# Patient Record
Sex: FEMALE | Race: WHITE | Employment: OTHER | URBAN - METROPOLITAN AREA
[De-identification: names, ages, dates, MRNs, and addresses within clinical notes are randomized per-mention and may not be internally consistent; named-entity substitution may affect disease eponyms.]

---

## 2017-06-05 ENCOUNTER — HOSPITAL ENCOUNTER (OUTPATIENT)
Dept: MAMMOGRAPHY | Age: 77
Discharge: HOME | End: 2017-06-05
Attending: INTERNAL MEDICINE
Payer: MEDICARE

## 2017-06-05 DIAGNOSIS — Z12.31 VISIT FOR SCREENING MAMMOGRAM: ICD-10-CM

## 2017-06-05 PROCEDURE — 77063 BREAST TOMOSYNTHESIS BI: CPT

## 2017-09-25 ENCOUNTER — HOSPITAL ENCOUNTER (OUTPATIENT)
Dept: BONE DENSITY | Age: 77
Discharge: HOME | End: 2017-09-25
Attending: INTERNAL MEDICINE
Payer: MEDICARE

## 2017-09-25 DIAGNOSIS — M81.8 OSTEOPOROSIS, IDIOPATHIC: ICD-10-CM

## 2017-09-25 PROCEDURE — 77080 DXA BONE DENSITY AXIAL: CPT

## 2018-05-11 ENCOUNTER — HOSPITAL ENCOUNTER (OUTPATIENT)
Dept: VASCULAR SURGERY | Age: 78
Discharge: HOME | End: 2018-05-11
Attending: INTERNAL MEDICINE
Payer: MEDICARE

## 2018-05-11 DIAGNOSIS — I10 ESSENTIAL HYPERTENSION, MALIGNANT: ICD-10-CM

## 2018-05-11 PROCEDURE — 93975 VASCULAR STUDY: CPT

## 2018-07-13 ENCOUNTER — HOSPITAL ENCOUNTER (OUTPATIENT)
Dept: MAMMOGRAPHY | Age: 78
Discharge: HOME | End: 2018-07-13
Attending: INTERNAL MEDICINE
Payer: MEDICARE

## 2018-07-13 DIAGNOSIS — Z12.31 VISIT FOR SCREENING MAMMOGRAM: ICD-10-CM

## 2018-07-13 PROCEDURE — 77063 BREAST TOMOSYNTHESIS BI: CPT

## 2019-08-05 ENCOUNTER — HOSPITAL ENCOUNTER (OUTPATIENT)
Dept: MAMMOGRAPHY | Age: 79
Discharge: HOME | End: 2019-08-05
Attending: INTERNAL MEDICINE
Payer: MEDICARE

## 2019-08-05 DIAGNOSIS — Z12.31 VISIT FOR SCREENING MAMMOGRAM: ICD-10-CM

## 2019-08-05 PROCEDURE — 77063 BREAST TOMOSYNTHESIS BI: CPT

## 2019-12-19 ENCOUNTER — HOSPITAL ENCOUNTER (OUTPATIENT)
Dept: LAB | Age: 79
Discharge: HOME | End: 2019-12-19
Payer: MEDICARE

## 2019-12-19 LAB
AST SERPL-CCNC: 18 U/L (ref 10–38)
BASOPHILS # BLD: 0.1 K/UL (ref 0–0.1)
BASOPHILS NFR BLD: 1 % (ref 0–2)
CREAT SERPL-MCNC: 0.98 MG/DL (ref 0.6–1.3)
DIFFERENTIAL METHOD BLD: ABNORMAL
EOSINOPHIL # BLD: 0.2 K/UL (ref 0–0.4)
EOSINOPHIL NFR BLD: 2 % (ref 0–5)
ERYTHROCYTE [DISTWIDTH] IN BLOOD BY AUTOMATED COUNT: 13.2 % (ref 11.6–14.5)
HCT VFR BLD AUTO: 38.8 % (ref 35–45)
HGB BLD-MCNC: 12.8 G/DL (ref 12–16)
LYMPHOCYTES # BLD: 1.1 K/UL (ref 0.9–3.6)
LYMPHOCYTES NFR BLD: 13 % (ref 21–52)
MCH RBC QN AUTO: 29.2 PG (ref 24–34)
MCHC RBC AUTO-ENTMCNC: 33 G/DL (ref 31–37)
MCV RBC AUTO: 88.6 FL (ref 74–97)
MONOCYTES # BLD: 0.7 K/UL (ref 0.05–1.2)
MONOCYTES NFR BLD: 8 % (ref 3–10)
NEUTS SEG # BLD: 6.7 K/UL (ref 1.8–8)
NEUTS SEG NFR BLD: 76 % (ref 40–73)
PLATELET # BLD AUTO: 248 K/UL (ref 135–420)
PMV BLD AUTO: 10.8 FL (ref 9.2–11.8)
RBC # BLD AUTO: 4.38 M/UL (ref 4.2–5.3)
WBC # BLD AUTO: 8.8 K/UL (ref 4.6–13.2)

## 2019-12-19 PROCEDURE — 85025 COMPLETE CBC W/AUTO DIFF WBC: CPT

## 2019-12-19 PROCEDURE — 84450 TRANSFERASE (AST) (SGOT): CPT

## 2019-12-19 PROCEDURE — 84080 ASSAY ALKALINE PHOSPHATASES: CPT

## 2019-12-19 PROCEDURE — 36415 COLL VENOUS BLD VENIPUNCTURE: CPT

## 2019-12-21 LAB
ALP BONE CFR SERPL: 38 % (ref 14–68)
ALP INTEST CFR SERPL: 0 % (ref 0–18)
ALP LIVER CFR SERPL: 62 % (ref 18–85)
ALP SERPL-CCNC: 82 IU/L (ref 39–117)

## 2020-01-10 ENCOUNTER — HOSPITAL ENCOUNTER (OUTPATIENT)
Dept: BONE DENSITY | Age: 80
Discharge: HOME | End: 2020-01-10
Attending: INTERNAL MEDICINE
Payer: MEDICARE

## 2020-01-10 DIAGNOSIS — M81.0 POST-MENOPAUSAL OSTEOPOROSIS: ICD-10-CM

## 2020-01-10 PROCEDURE — 77080 DXA BONE DENSITY AXIAL: CPT

## 2020-11-24 ENCOUNTER — HOSPITAL ENCOUNTER (EMERGENCY)
Age: 80
Discharge: HOME | End: 2020-11-24
Attending: EMERGENCY MEDICINE
Payer: MEDICARE

## 2020-11-24 VITALS
DIASTOLIC BLOOD PRESSURE: 71 MMHG | WEIGHT: 111 LBS | HEIGHT: 70 IN | SYSTOLIC BLOOD PRESSURE: 159 MMHG | TEMPERATURE: 98.1 F | BODY MASS INDEX: 15.89 KG/M2 | RESPIRATION RATE: 18 BRPM | OXYGEN SATURATION: 100 % | HEART RATE: 92 BPM

## 2020-11-24 DIAGNOSIS — I16.0 ASYMPTOMATIC HYPERTENSIVE URGENCY: Primary | ICD-10-CM

## 2020-11-24 PROCEDURE — 74011250637 HC RX REV CODE- 250/637: Performed by: STUDENT IN AN ORGANIZED HEALTH CARE EDUCATION/TRAINING PROGRAM

## 2020-11-24 PROCEDURE — 99284 EMERGENCY DEPT VISIT MOD MDM: CPT

## 2020-11-24 RX ORDER — OLMESARTAN MEDOXOMIL 40 MG/1
40 TABLET ORAL DAILY
COMMUNITY

## 2020-11-24 RX ORDER — HYDRALAZINE HYDROCHLORIDE 25 MG/1
75 TABLET, FILM COATED ORAL 3 TIMES DAILY
COMMUNITY

## 2020-11-24 RX ORDER — HYDRALAZINE HYDROCHLORIDE 25 MG/1
75 TABLET, FILM COATED ORAL
Status: COMPLETED | OUTPATIENT
Start: 2020-11-24 | End: 2020-11-24

## 2020-11-24 RX ORDER — CARVEDILOL 6.25 MG/1
6.25 TABLET ORAL 2 TIMES DAILY WITH MEALS
COMMUNITY

## 2020-11-24 RX ORDER — MYCOPHENOLIC ACID 180 MG/1
360 TABLET, DELAYED RELEASE ORAL 3 TIMES DAILY
COMMUNITY

## 2020-11-24 RX ORDER — RABEPRAZOLE SODIUM 20 MG/1
20 TABLET, DELAYED RELEASE ORAL DAILY
COMMUNITY

## 2020-11-24 RX ORDER — CARVEDILOL 6.25 MG/1
6.25 TABLET ORAL
Status: COMPLETED | OUTPATIENT
Start: 2020-11-24 | End: 2020-11-24

## 2020-11-24 RX ADMIN — CARVEDILOL 6.25 MG: 6.25 TABLET, FILM COATED ORAL at 17:03

## 2020-11-24 RX ADMIN — HYDRALAZINE HYDROCHLORIDE 75 MG: 25 TABLET, FILM COATED ORAL at 17:02

## 2020-11-24 NOTE — ED TRIAGE NOTES
Patient presents with c/o elevated blood pressure. She states being advised by Dr. Katelin Welsh to report to ER for further evaluation of very high blood pressure.

## 2020-11-24 NOTE — ED NOTES
Medicated per order and connected to BP cuff and pulse ox for monitoring. Call bell in reach. Warm blanket provided.

## 2020-11-24 NOTE — DISCHARGE INSTRUCTIONS
Patient Education        Acute High Blood Pressure: Care Instructions  Your Care Instructions     Acute high blood pressure is very high blood pressure. It's a serious problem. Very high blood pressure can damage your brain, heart, eyes, and kidneys. You may have been given medicines to lower your blood pressure. You may have gotten them as pills or through a needle in one of your veins. This is called an IV. And maybe you were given other medicines too. These can be needed when high blood pressure causes other problems. To keep your blood pressure at a lower level, you may need to make healthy lifestyle changes. And you will probably need to take medicines. Be sure to follow up with your doctor about your blood pressure and what you can do about it. Follow-up care is a key part of your treatment and safety. Be sure to make and go to all appointments, and call your doctor if you are having problems. It's also a good idea to know your test results and keep a list of the medicines you take. How can you care for yourself at home? · See your doctor as often as he or she recommends. This is to make sure your blood pressure is under control. · Take your blood pressure medicine exactly as prescribed. You may take one or more types. They include diuretics, beta-blockers, ACE inhibitors, calcium channel blockers, and angiotensin II receptor blockers. Call your doctor if you think you are having a problem with your medicine. · If you take blood pressure medicine, talk to your doctor before you take decongestants or anti-inflammatory medicine, such as ibuprofen. These can raise blood pressure. · Learn how to check your blood pressure at home. Check it often. · Ask your doctor if it's okay to drink alcohol. · Talk to your doctor about lifestyle changes that can help blood pressure. These include being active and managing your weight. · Don't smoke. Smoking increases your risk for heart attack and stroke.   When should you call for help? Call  911 anytime you think you may need emergency care. This may mean having symptoms that suggest that your blood pressure is causing a serious heart or blood vessel problem. Your blood pressure may be over 180/120. For example, call 911 if:    · You have symptoms of a heart attack. These may include:  ? Chest pain or pressure, or a strange feeling in the chest.  ? Sweating. ? Shortness of breath. ? Nausea or vomiting. ? Pain, pressure, or a strange feeling in the back, neck, jaw, or upper belly or in one or both shoulders or arms. ? Lightheadedness or sudden weakness. ? A fast or irregular heartbeat.     · You have symptoms of a stroke. These may include:  ? Sudden numbness, tingling, weakness, or loss of movement in your face, arm, or leg, especially on only one side of your body. ? Sudden vision changes. ? Sudden trouble speaking. ? Sudden confusion or trouble understanding simple statements. ? Sudden problems with walking or balance. ? A sudden, severe headache that is different from past headaches.     · You have severe back or belly pain. Do not wait until your blood pressure comes down on its own. Get help right away. Call your doctor now or seek immediate care if:    · Your blood pressure is much higher than normal (such as 180/120 or higher), but you don't have symptoms.     · You think high blood pressure is causing symptoms, such as:  ? Severe headache.  ? Blurry vision. Watch closely for changes in your health, and be sure to contact your doctor if:    · Your blood pressure measures higher than your doctor recommends at least 2 times. That means the top number is higher or the bottom number is higher, or both.     · You think you may be having side effects from your blood pressure medicine. Where can you learn more?   Go to http://www.gray.com/  Enter H919 in the search box to learn more about \"Acute High Blood Pressure: Care Instructions. \"  Current as of: December 16, 2019               Content Version: 12.6  © 5581-0205 [employer]. Care instructions adapted under license by [facility] (which disclaims liability or warranty for this information). If you have questions about a medical condition or this instruction, always ask your healthcare professional. [address] any warranty or liability for your use of this information.

## 2020-11-24 NOTE — ED PROVIDER NOTES
EMERGENCY DEPARTMENT HISTORY AND PHYSICAL EXAM    4:12 PM      Date: 11/24/2020  Patient Name: Krishna Mitchell    History of Presenting Illness     Chief Complaint   Patient presents with    Hypertension       History Provided By: Patient  Location/Duration/Severity/Modifying factors   Pt sent by Dr. Heath Hernandez for very high blood pressure. At Dr. Rivera Peoples office her BP was consistently in the 200's/100's despite recheck and manual measurement. She had no symptoms and still does not. She says about 6 weeks ago her losartan was changed to olmesartan. She has tried diuretics in the past and they were discontinued after some concern for falls associated with ?orthostatic hypotension. She has adverse reactions to lisinopril and amlodipine. She was changed from metoprolol to coreg recently also. PCP: Judson Rowan MD    Current Outpatient Medications   Medication Sig Dispense Refill    carvediloL (Coreg) 6.25 mg tablet Take 6.25 mg by mouth two (2) times daily (with meals).  hydrALAZINE (APRESOLINE) 25 mg tablet Take 75 mg by mouth three (3) times daily.  olmesartan (BENICAR) 40 mg tablet Take 40 mg by mouth daily.  RABEprazole (Aciphex) 20 mg tablet Take 20 mg by mouth daily.  mycophenolic acid (MYFORTIC) 240 mg delayed release tablet Take 360 mg by mouth three (3) times daily.  multivitamin (ONE A DAY) tablet Take 1 Tab by mouth daily.  simvastatin (ZOCOR) 40 mg tablet Take 1 Tab by mouth nightly. [Request refills from PCP (Dr. Carlos Enrique Gomez)]  Indications: MYOCARDIAL INFARCTION PREVENTION 15 Tab 0    cholecalciferol (VITAMIN D3) 1,000 unit tablet Take 2 Tabs by mouth two (2) times a day. [Request refills from PCP (Dr. Carlos Enrique Gomez)]  Indications: VITAMIN D DEFICIENCY 60 Tab 0    budesonide (ENTOCORT EC) 3 mg capsule Take 3 Caps by mouth daily. 100 Cap 0    aspirin 81 mg chewable tablet Take 81 mg by mouth daily (with breakfast).       clopidogrel (PLAVIX) 75 mg tablet Take 75 mg by mouth daily (with dinner). Past History     Past Medical History:  Past Medical History:   Diagnosis Date    Acute gouty arthritis 2014    Left great toe    Anemia in chronic kidney disease     Ankylosing spondylitis ([address]     Anticoagulated on Coumadin 2014    Atherosclerosis of native arteries of the extremities with ulceration(440.23)     Chronic systolic heart failure (HCC) 2014    EF 45% on 2D ECHO done 2014    CKD (chronic kidney disease) stage 4, GFR 15-29 ml/min ([facility])     Collagenous colitis     Fibrocystic breast changes     Fibrocystic breast disease     Gastroesophageal reflux disease     GERD (gastroesophageal reflux disease)     Hemorrhoids     Hiatal hernia     Hypertension     Menopause     Mild mitral regurgitation by prior echocardiogram 2014    2D ECHO done 2014    Moderate tricuspid regurgitation by prior echocardiogram 2014    2D ECHO done 2014    Mural thrombus of left ventricular apex following MI ([address] 2014    Anticoagulated on Coumadin    NSTEMI (non-ST elevated myocardial infarction) ([address] 2014    Osteoarthritis     Peripheral arterial disease ([address]     S/P coronary artery stent placement 6/3/2014    BRENDEN placed in LAD (2014 - Dr. Randi Dubin)    Severe protein-energy malnutrition ([address]     Vitamin D deficiency 2014    Vitamin D 25-Hydroxy 16.9       Past Surgical History:  Past Surgical History:   Procedure Laterality Date    HX  SECTION      HX CYST INCISION AND DRAINAGE Bilateral mid 's    Benign Bilateral- dr Baldo London    Placenta previa, Hemmorhage.     HX ORTHOPAEDIC      Knee-Torn Meniscus       Family History:  Family History   Problem Relation Age of Onset    Heart Disease Mother         MVP    Lung Disease Father         Black Lung Disease       Social History:  Social History     Tobacco Use    Smoking status: Never Smoker    Smokeless tobacco: Never Used   Substance Use Topics    Alcohol use: Yes     Comment: social    Drug use: No       Allergies: Allergies   Allergen Reactions    Adhesive Other (comments)     Redness from bandaid adhesive    Embeline [Clobetasol] Hives    Etanercept Other (comments)    Methotrexate Nausea and Vomiting and Other (comments)     fever    Mobic [Meloxicam] Hives    Monopril [Fosinopril] Rash    Norvasc [Amlodipine] Other (comments)     headache    Oats Nausea Only         Review of Systems     Review of Systems   Constitutional: Negative for chills, diaphoresis, fatigue and fever. HENT: Negative for congestion, facial swelling, hearing loss, rhinorrhea and sinus pain. Eyes: Negative for pain and visual disturbance. Respiratory: Negative for cough and shortness of breath. Cardiovascular: Negative for chest pain, palpitations and leg swelling. Gastrointestinal: Negative for abdominal pain, constipation, nausea and vomiting. Endocrine: Negative for polydipsia and polyuria. Genitourinary: Negative for dysuria, pelvic pain and urgency. Musculoskeletal: Negative for joint swelling and myalgias. Skin: Negative for color change and rash. Neurological: Negative for dizziness, tremors, syncope, weakness, light-headedness, numbness and headaches. Psychiatric/Behavioral: Negative for agitation, confusion, decreased concentration and dysphoric mood. The patient is nervous/anxious. Physical Exam     Visit Vitals  BP (!) 159/71   Pulse 92   Temp 98.1 °F (36.7 °C)   Resp 18   Ht 5' 10\" (1.778 m)   Wt 50.3 kg (111 lb)   SpO2 100%   BMI 15.93 kg/m²         Physical Exam  Constitutional:       General: She is not in acute distress. Appearance: Normal appearance. HENT:      Head: Normocephalic and atraumatic. Mouth/Throat:      Mouth: Mucous membranes are moist.      Pharynx: Oropharynx is clear. Eyes:      Extraocular Movements: Extraocular movements intact. Conjunctiva/sclera: Conjunctivae normal.   Neck:      Musculoskeletal: No neck rigidity or muscular tenderness. Cardiovascular:      Rate and Rhythm: Normal rate and regular rhythm. Pulmonary:      Effort: Pulmonary effort is normal.      Breath sounds: Normal breath sounds. Abdominal:      Palpations: Abdomen is soft. Tenderness: There is no abdominal tenderness. Musculoskeletal:         General: No swelling or tenderness. Skin:     General: Skin is warm and dry. Neurological:      General: No focal deficit present. Mental Status: She is alert and oriented to person, place, and time. Mental status is at baseline. Cranial Nerves: No cranial nerve deficit. Motor: No weakness. Gait: Gait normal.   Psychiatric:         Mood and Affect: Mood normal.         Behavior: Behavior normal.      Comments: Somewhat anxious           Diagnostic Study Results     Labs -  No results found for this or any previous visit (from the past 12 hour(s)). Radiologic Studies -   No orders to display         Medical Decision Making   I am the first provider for this patient. I reviewed the vital signs, available nursing notes, past medical history, past surgical history, family history and social history. Vital Signs-Reviewed the patient's vital signs. Records Reviewed: Nursing Notes and Old Medical Records (Time of Review: 4:12 PM)    ED Course: Progress Notes, Reevaluation, and Consults:  Pt given extra dose of carvedilol 6.25. She was given her 75 of PO hydralazine that she had due and her BP improved to 150's/80's. Discussed doubling coreg until her FU with her PCP scheduled in ~8 days. Provider Notes (Medical Decision Making):   MDM  Number of Diagnoses or Management Options  Asymptomatic hypertensive urgency:   Diagnosis management comments: Asymptomatic high blood pressure, no signs or symptoms of end organ dysfunction, close FU with PCP already scheduled.  Return precautions given.     DDx: hypertensive emergency, stroke, ACS, panic attack, TARA, pheochromocytoma       Procedures    Critical Care Time:       Diagnosis     Clinical Impression:   1. Asymptomatic hypertensive urgency        Disposition:     Follow-up Information     Follow up With Specialties Details Why Contact Info    Manan Lozano MD Internal Medicine In 1 week for continued BP management [place] [id number]  [phone]             Discharge Medication List as of 11/24/2020  5:47 PM      CONTINUE these medications which have NOT CHANGED    Details   carvediloL (Coreg) 6.25 mg tablet Take 6.25 mg by mouth two (2) times daily (with meals). , Historical Med      hydrALAZINE (APRESOLINE) 25 mg tablet Take 75 mg by mouth three (3) times daily. , Historical Med      olmesartan (BENICAR) 40 mg tablet Take 40 mg by mouth daily. , Historical Med      RABEprazole (Aciphex) 20 mg tablet Take 20 mg by mouth daily. , Historical Med      mycophenolic acid (MYFORTIC) 075 mg delayed release tablet Take 360 mg by mouth three (3) times daily. , Historical Med      multivitamin (ONE A DAY) tablet Take 1 Tab by mouth daily. , Historical Med      simvastatin (ZOCOR) 40 mg tablet Take 1 Tab by mouth nightly. [Request refills from PCP (Dr. Tuan Gomez)]  Indications: MYOCARDIAL INFARCTION PREVENTION, Normal, Disp-15 Tab, R-0      cholecalciferol (VITAMIN D3) 1,000 unit tablet Take 2 Tabs by mouth two (2) times a day. [Request refills from PCP (Dr. Tuan Gomez)]  Indications: VITAMIN D DEFICIENCY, Normal, Disp-60 Tab, R-0      budesonide (ENTOCORT EC) 3 mg capsule Take 3 Caps by mouth daily. , Print, Disp-100 Cap, R-0      aspirin 81 mg chewable tablet Take 81 mg by mouth daily (with breakfast). , Historical Med      clopidogrel (PLAVIX) 75 mg tablet Take 75 mg by mouth daily (with dinner). , Pratik Garcia MD  [facility]  4:14 PM

## 2020-12-04 ENCOUNTER — HOSPITAL ENCOUNTER (EMERGENCY)
Age: 80
Discharge: HOME | End: 2020-12-04
Attending: EMERGENCY MEDICINE
Payer: MEDICARE

## 2020-12-04 ENCOUNTER — APPOINTMENT (OUTPATIENT)
Dept: GENERAL RADIOLOGY | Age: 80
End: 2020-12-04
Attending: PHYSICIAN ASSISTANT
Payer: MEDICARE

## 2020-12-04 VITALS
HEART RATE: 83 BPM | RESPIRATION RATE: 19 BRPM | SYSTOLIC BLOOD PRESSURE: 156 MMHG | WEIGHT: 111 LBS | BODY MASS INDEX: 15.89 KG/M2 | OXYGEN SATURATION: 99 % | TEMPERATURE: 97.3 F | HEIGHT: 70 IN | DIASTOLIC BLOOD PRESSURE: 58 MMHG

## 2020-12-04 DIAGNOSIS — F41.1 ANXIETY STATE: ICD-10-CM

## 2020-12-04 DIAGNOSIS — R55 SYNCOPE, UNSPECIFIED SYNCOPE TYPE: Primary | ICD-10-CM

## 2020-12-04 LAB
ALBUMIN SERPL-MCNC: 4 G/DL (ref 3.4–5)
ALBUMIN/GLOB SERPL: 1 {RATIO} (ref 0.8–1.7)
ALP SERPL-CCNC: 61 U/L (ref 45–117)
ALT SERPL-CCNC: 22 U/L (ref 13–56)
ANION GAP SERPL CALC-SCNC: 6 MMOL/L (ref 3–18)
AST SERPL-CCNC: 16 U/L (ref 10–38)
ATRIAL RATE: 67 BPM
BASOPHILS # BLD: 0.1 K/UL (ref 0–0.1)
BASOPHILS NFR BLD: 1 % (ref 0–2)
BILIRUB SERPL-MCNC: 0.3 MG/DL (ref 0.2–1)
BNP SERPL-MCNC: 1368 PG/ML (ref 0–1800)
BUN SERPL-MCNC: 35 MG/DL (ref 7–18)
BUN/CREAT SERPL: 31 (ref 12–20)
CALCIUM SERPL-MCNC: 9.5 MG/DL (ref 8.5–10.1)
CALCULATED P AXIS, ECG09: 84 DEGREES
CALCULATED R AXIS, ECG10: -52 DEGREES
CALCULATED T AXIS, ECG11: 92 DEGREES
CHLORIDE SERPL-SCNC: 99 MMOL/L (ref 100–111)
CK MB CFR SERPL CALC: 2.9 % (ref 0–4)
CK MB CFR SERPL CALC: 3.3 % (ref 0–4)
CK MB SERPL-MCNC: 1.7 NG/ML (ref 5–25)
CK MB SERPL-MCNC: 1.7 NG/ML (ref 5–25)
CK SERPL-CCNC: 52 U/L (ref 26–192)
CK SERPL-CCNC: 58 U/L (ref 26–192)
CO2 SERPL-SCNC: 29 MMOL/L (ref 21–32)
CREAT SERPL-MCNC: 1.13 MG/DL (ref 0.6–1.3)
DIAGNOSIS, 93000: NORMAL
DIFFERENTIAL METHOD BLD: ABNORMAL
EOSINOPHIL # BLD: 0.2 K/UL (ref 0–0.4)
EOSINOPHIL NFR BLD: 3 % (ref 0–5)
ERYTHROCYTE [DISTWIDTH] IN BLOOD BY AUTOMATED COUNT: 13 % (ref 11.6–14.5)
GLOBULIN SER CALC-MCNC: 3.9 G/DL (ref 2–4)
GLUCOSE SERPL-MCNC: 108 MG/DL (ref 74–99)
HCT VFR BLD AUTO: 37.7 % (ref 35–45)
HGB BLD-MCNC: 12.9 G/DL (ref 12–16)
LYMPHOCYTES # BLD: 1.1 K/UL (ref 0.9–3.6)
LYMPHOCYTES NFR BLD: 12 % (ref 21–52)
MAGNESIUM SERPL-MCNC: 2.2 MG/DL (ref 1.6–2.6)
MCH RBC QN AUTO: 29.7 PG (ref 24–34)
MCHC RBC AUTO-ENTMCNC: 34.2 G/DL (ref 31–37)
MCV RBC AUTO: 86.7 FL (ref 74–97)
MONOCYTES # BLD: 0.7 K/UL (ref 0.05–1.2)
MONOCYTES NFR BLD: 8 % (ref 3–10)
NEUTS SEG # BLD: 6.9 K/UL (ref 1.8–8)
NEUTS SEG NFR BLD: 76 % (ref 40–73)
P-R INTERVAL, ECG05: 164 MS
PLATELET # BLD AUTO: 216 K/UL (ref 135–420)
PMV BLD AUTO: 11 FL (ref 9.2–11.8)
POTASSIUM SERPL-SCNC: 3.9 MMOL/L (ref 3.5–5.5)
PROT SERPL-MCNC: 7.9 G/DL (ref 6.4–8.2)
Q-T INTERVAL, ECG07: 432 MS
QRS DURATION, ECG06: 130 MS
QTC CALCULATION (BEZET), ECG08: 456 MS
RBC # BLD AUTO: 4.35 M/UL (ref 4.2–5.3)
SODIUM SERPL-SCNC: 134 MMOL/L (ref 136–145)
TROPONIN I SERPL-MCNC: 0.03 NG/ML (ref 0–0.04)
TROPONIN I SERPL-MCNC: 0.04 NG/ML (ref 0–0.04)
TSH SERPL DL<=0.05 MIU/L-ACNC: 1 UIU/ML (ref 0.36–3.74)
VENTRICULAR RATE, ECG03: 67 BPM
WBC # BLD AUTO: 9 K/UL (ref 4.6–13.2)

## 2020-12-04 PROCEDURE — 84443 ASSAY THYROID STIM HORMONE: CPT

## 2020-12-04 PROCEDURE — 82550 ASSAY OF CK (CPK): CPT

## 2020-12-04 PROCEDURE — 83880 ASSAY OF NATRIURETIC PEPTIDE: CPT

## 2020-12-04 PROCEDURE — 99284 EMERGENCY DEPT VISIT MOD MDM: CPT

## 2020-12-04 PROCEDURE — 85025 COMPLETE CBC W/AUTO DIFF WBC: CPT

## 2020-12-04 PROCEDURE — 96374 THER/PROPH/DIAG INJ IV PUSH: CPT

## 2020-12-04 PROCEDURE — 93005 ELECTROCARDIOGRAM TRACING: CPT

## 2020-12-04 PROCEDURE — 71045 X-RAY EXAM CHEST 1 VIEW: CPT

## 2020-12-04 PROCEDURE — 80053 COMPREHEN METABOLIC PANEL: CPT

## 2020-12-04 PROCEDURE — 74011250636 HC RX REV CODE- 250/636: Performed by: PHYSICIAN ASSISTANT

## 2020-12-04 PROCEDURE — 83735 ASSAY OF MAGNESIUM: CPT

## 2020-12-04 PROCEDURE — 74011250637 HC RX REV CODE- 250/637: Performed by: PHYSICIAN ASSISTANT

## 2020-12-04 RX ORDER — LORAZEPAM 2 MG/ML
0.5 INJECTION INTRAMUSCULAR ONCE
Status: COMPLETED | OUTPATIENT
Start: 2020-12-04 | End: 2020-12-04

## 2020-12-04 RX ADMIN — LORAZEPAM 0.5 MG: 2 INJECTION INTRAMUSCULAR; INTRAVENOUS at 17:03

## 2020-12-04 RX ADMIN — HYDRALAZINE HYDROCHLORIDE 75 MG: 50 TABLET, FILM COATED ORAL at 17:02

## 2020-12-04 NOTE — ED PROVIDER NOTES
EMERGENCY DEPARTMENT HISTORY AND PHYSICAL EXAM    2:41 PM      Date: 2020  Patient Name: Graciela Brar    History of Presenting Illness     Chief Complaint   Patient presents with    Dizziness         History Provided By: Patient and EMS  Location/Duration/Severity/Modifying factors   This is a 78 yo female with a PMH of HTN, Hyperlipidemia, CHF, CKD, NSTEMI, Anxiety presenting via EMS after a syncopal episode while attending a . Patient reports she was standing, felt lightheaded and then sat down and passed out. Her daughter witnessed the entire event. Patient reports previous similar episode over  where she was evaluated at the ED and Cardiology and is on a Holter monitor. She reports they discovered she had low sodium. Patient reports she has anxiety and thinks these episodes are related. She reports her symptoms have resolved here in the ED. She denies any chest pain, shortness of breath, fever, nausea, vomiting, or abdominal pain. PCP: Luna Zarco MD    Current Outpatient Medications   Medication Sig Dispense Refill    carvediloL (Coreg) 6.25 mg tablet Take 6.25 mg by mouth two (2) times daily (with meals).  hydrALAZINE (APRESOLINE) 25 mg tablet Take 75 mg by mouth three (3) times daily.  olmesartan (BENICAR) 40 mg tablet Take 40 mg by mouth daily.  RABEprazole (Aciphex) 20 mg tablet Take 20 mg by mouth daily.  mycophenolic acid (MYFORTIC) 262 mg delayed release tablet Take 360 mg by mouth three (3) times daily.  multivitamin (ONE A DAY) tablet Take 1 Tab by mouth daily.  simvastatin (ZOCOR) 40 mg tablet Take 1 Tab by mouth nightly. [Request refills from PCP (Dr. Hugo Gomez)]  Indications: MYOCARDIAL INFARCTION PREVENTION 15 Tab 0    cholecalciferol (VITAMIN D3) 1,000 unit tablet Take 2 Tabs by mouth two (2) times a day.  [Request refills from PCP (Dr. Hugo Gomez)]  Indications: VITAMIN D DEFICIENCY 60 Tab 0    budesonide (ENTOCORT EC) 3 mg capsule Take 3 Caps by mouth daily. 100 Cap 0    aspirin 81 mg chewable tablet Take 81 mg by mouth daily (with breakfast).  clopidogrel (PLAVIX) 75 mg tablet Take 75 mg by mouth daily (with dinner). Past History     Past Medical History:  Past Medical History:   Diagnosis Date    Acute gouty arthritis 2014    Left great toe    Anemia in chronic kidney disease     Ankylosing spondylitis ([address]     Anticoagulated on Coumadin 2014    Atherosclerosis of native arteries of the extremities with ulceration(440.23)     Chronic systolic heart failure (HCC) 2014    EF 45% on 2D ECHO done 2014    CKD (chronic kidney disease) stage 4, GFR 15-29 ml/min ([facility])     Collagenous colitis     Fibrocystic breast changes     Fibrocystic breast disease     Gastroesophageal reflux disease     GERD (gastroesophageal reflux disease)     Hemorrhoids     Hiatal hernia     Hypertension     Menopause     Mild mitral regurgitation by prior echocardiogram 2014    2D ECHO done 2014    Moderate tricuspid regurgitation by prior echocardiogram 2014    2D ECHO done 2014    Mural thrombus of left ventricular apex following MI ([address] 2014    Anticoagulated on Coumadin    NSTEMI (non-ST elevated myocardial infarction) ([address] 2014    Osteoarthritis     Peripheral arterial disease ([address]     S/P coronary artery stent placement 6/3/2014    BRENDEN placed in LAD (2014 - Dr. Tracy Hager)    Severe protein-energy malnutrition ([address]     Vitamin D deficiency 2014    Vitamin D 25-Hydroxy 16.9       Past Surgical History:  Past Surgical History:   Procedure Laterality Date    HX  SECTION      HX CYST INCISION AND DRAINAGE Bilateral mid 's    Benign Bilateral- dr Marguerite Corrales    Placenta previa, Hemmorhage.     HX ORTHOPAEDIC      Knee-Torn Meniscus       Family History:  Family History   Problem Relation Age of Onset    Heart Disease Mother         MVP    Lung Disease Father         Black Lung Disease       Social History:  Social History     Tobacco Use    Smoking status: Never Smoker    Smokeless tobacco: Never Used   Substance Use Topics    Alcohol use: Yes     Comment: social    Drug use: No       Allergies: Allergies   Allergen Reactions    Adhesive Other (comments)     Redness from bandaid adhesive    Embeline [Clobetasol] Hives    Etanercept Other (comments)    Methotrexate Nausea and Vomiting and Other (comments)     fever    Mobic [Meloxicam] Hives    Monopril [Fosinopril] Rash    Norvasc [Amlodipine] Other (comments)     headache    Oats Nausea Only         Review of Systems     Review of Systems   Constitutional: Negative for chills, fatigue and fever. HENT: Negative for sore throat, trouble swallowing and voice change. Eyes: Negative for visual disturbance. Respiratory: Negative for chest tightness, shortness of breath and wheezing. Cardiovascular: Negative for chest pain, palpitations and leg swelling. Gastrointestinal: Negative for abdominal pain, diarrhea, nausea and vomiting. Genitourinary: Negative for dysuria and hematuria. Musculoskeletal: Positive for arthralgias. Skin: Negative. Neurological: Positive for tremors, syncope and light-headedness. Negative for dizziness, speech difficulty, weakness, numbness and headaches. Psychiatric/Behavioral: Positive for agitation. Negative for confusion. The patient is nervous/anxious. All other systems reviewed and are negative. Physical Exam     Visit Vitals  BP (!) 156/58   Pulse 83   Temp 97.3 °F (36.3 °C)   Resp 19   Ht 5' 10\" (1.778 m)   Wt 50.3 kg (111 lb)   SpO2 99%   BMI 15.93 kg/m²       Physical Exam  Vitals signs and nursing note reviewed. Constitutional:       General: She is not in acute distress. Appearance: Normal appearance. HENT:      Head: Normocephalic and atraumatic.       Right Ear: External ear normal. Left Ear: External ear normal.      Nose: Nose normal.      Mouth/Throat:      Pharynx: Oropharynx is clear. Eyes:      Extraocular Movements: Extraocular movements intact. Pupils: Pupils are equal, round, and reactive to light. Neck:      Musculoskeletal: Normal range of motion. Cardiovascular:      Rate and Rhythm: Normal rate and regular rhythm. Pulses: Normal pulses. Heart sounds: Murmur present. Pulmonary:      Effort: Pulmonary effort is normal. No respiratory distress. Breath sounds: Normal breath sounds. No wheezing. Abdominal:      Palpations: Abdomen is soft. Tenderness: There is no abdominal tenderness. There is no guarding. Musculoskeletal: Normal range of motion. General: Tenderness present. Comments: Chronic right ankle pain from fusion   Skin:     General: Skin is warm and dry. Capillary Refill: Capillary refill takes less than 2 seconds. Neurological:      Mental Status: She is alert and oriented to person, place, and time. Mental status is at baseline. Cranial Nerves: No cranial nerve deficit. Sensory: No sensory deficit. Motor: No weakness.       Coordination: Coordination normal.   Psychiatric:         Mood and Affect: Mood normal.           Diagnostic Study Results     Labs -  Recent Results (from the past 12 hour(s))   EKG, 12 LEAD, INITIAL    Collection Time: 12/04/20  2:57 PM   Result Value Ref Range    Ventricular Rate 67 BPM    Atrial Rate 67 BPM    P-R Interval 164 ms    QRS Duration 130 ms    Q-T Interval 432 ms    QTC Calculation (Bezet) 456 ms    Calculated P Axis 84 degrees    Calculated R Axis -52 degrees    Calculated T Axis 92 degrees    Diagnosis       Sinus rhythm with occasional premature ventricular complexes  Possible Left atrial enlargement  Left axis deviation  Left ventricular hypertrophy with QRS widening and repolarization abnormality  Abnormal ECG  When compared with ECG of 04-JUN-2014 05:08,  Significant changes have occurred  Confirmed by Dorene Reid ([id number]) on 12/4/2020 1:07:85 PM     METABOLIC PANEL, COMPREHENSIVE    Collection Time: 12/04/20  3:15 PM   Result Value Ref Range    Sodium 134 (L) 136 - 145 mmol/L    Potassium 3.9 3.5 - 5.5 mmol/L    Chloride 99 (L) 100 - 111 mmol/L    CO2 29 21 - 32 mmol/L    Anion gap 6 3.0 - 18 mmol/L    Glucose 108 (H) 74 - 99 mg/dL    BUN 35 (H) 7.0 - 18 MG/DL    Creatinine 1.13 0.6 - 1.3 MG/DL    BUN/Creatinine ratio 31 (H) 12 - 20      GFR est AA 56 (L) >60 ml/min/1.73m2    GFR est non-AA 46 (L) >60 ml/min/1.73m2    Calcium 9.5 8.5 - 10.1 MG/DL    Bilirubin, total 0.3 0.2 - 1.0 MG/DL    ALT (SGPT) 22 13 - 56 U/L    AST (SGOT) 16 10 - 38 U/L    Alk. phosphatase 61 45 - 117 U/L    Protein, total 7.9 6.4 - 8.2 g/dL    Albumin 4.0 3.4 - 5.0 g/dL    Globulin 3.9 2.0 - 4.0 g/dL    A-G Ratio 1.0 0.8 - 1.7     CARDIAC PANEL,(CK, CKMB & TROPONIN)    Collection Time: 12/04/20  3:15 PM   Result Value Ref Range    CK - MB 1.7 <3.6 ng/ml    CK-MB Index 2.9 0.0 - 4.0 %    CK 58 26 - 192 U/L    Troponin-I, QT 0.03 0.0 - 0.045 NG/ML   CBC WITH AUTOMATED DIFF    Collection Time: 12/04/20  3:15 PM   Result Value Ref Range    WBC 9.0 4.6 - 13.2 K/uL    RBC 4.35 4.20 - 5.30 M/uL    HGB 12.9 12.0 - 16.0 g/dL    HCT 37.7 35.0 - 45.0 %    MCV 86.7 74.0 - 97.0 FL    MCH 29.7 24.0 - 34.0 PG    MCHC 34.2 31.0 - 37.0 g/dL    RDW 13.0 11.6 - 14.5 %    PLATELET 733 523 - 412 K/uL    MPV 11.0 9.2 - 11.8 FL    NEUTROPHILS 76 (H) 40 - 73 %    LYMPHOCYTES 12 (L) 21 - 52 %    MONOCYTES 8 3 - 10 %    EOSINOPHILS 3 0 - 5 %    BASOPHILS 1 0 - 2 %    ABS. NEUTROPHILS 6.9 1.8 - 8.0 K/UL    ABS. LYMPHOCYTES 1.1 0.9 - 3.6 K/UL    ABS. MONOCYTES 0.7 0.05 - 1.2 K/UL    ABS. EOSINOPHILS 0.2 0.0 - 0.4 K/UL    ABS.  BASOPHILS 0.1 0.0 - 0.1 K/UL    DF AUTOMATED     TSH 3RD GENERATION    Collection Time: 12/04/20  3:15 PM   Result Value Ref Range    TSH 1.00 0.36 - 3.74 uIU/mL   MAGNESIUM Collection Time: 12/04/20  3:15 PM   Result Value Ref Range    Magnesium 2.2 1.6 - 2.6 mg/dL   NT-PRO BNP    Collection Time: 12/04/20  3:15 PM   Result Value Ref Range    NT pro-BNP 1,368 0 - 1,800 PG/ML   CARDIAC PANEL,(CK, CKMB & TROPONIN)    Collection Time: 12/04/20  7:08 PM   Result Value Ref Range    CK - MB 1.7 <3.6 ng/ml    CK-MB Index 3.3 0.0 - 4.0 %    CK 52 26 - 192 U/L    Troponin-I, QT 0.04 0.0 - 0.045 NG/ML       Radiologic Studies -   XR CHEST PORT   Final Result   IMPRESSION:      Borderline prominent cardiac silhouette with perhaps mild perihilar interstitial   infiltrate/edema. Medical Decision Making   I am the first provider for this patient. I reviewed the vital signs, available nursing notes, past medical history, past surgical history, family history and social history. Vital Signs-Reviewed the patient's vital signs. EKG: Interpreted by myself and Dr. Raciel Simms. Rate 67. . . QTc 456. Normal sinus rhythm. No acute ischemic changes. Records Reviewed: Nursing Notes and Old Medical Records (Time of Review: 2:41 PM)    ED Course: Progress Notes, Reevaluation, and Consults:    ED Course as of Dec 04 2038   Fri Dec 04, 2020   1629 NT pro-BNP: 1,368 [JM]   1630 XR CHEST PORT [JM]      ED Course User Index  [JM] Sybil Noble, DO       Provider Notes (Medical Decision Making): This is a 78-year-old female presenting with signs and symptoms consistent with syncopal episode    Initial considerations in this patient include ACS, infection, thyroid abnormalities, arrhythmia, electrolyte derangement, hypoglycemia, anxiety, amongst others    Patient presented with a syncopal episode while standing. Patient denied any trauma. This is her second episode in 2 weeks. She is currently undergoing cardiology evaluation with Holter monitor for arrhythmia. EKG was obtained with no signs of acute ischemia.  Chest X-ray shows borderline prominent cardiac silhouette with perhaps mild perihilar interstitial infiltrate/edema. Initial cardiac biomarkers were negative so a repeat panel was ordered at three hours and was also negative. TSH was 1. Mag 2.2. BNP 1368. Sodium 134. Patient symptoms resolved prior to arrival to the emergency department. And she remained asymptomatic throughout her entire stay. Patient will be discharged home to follow-up with her primary care doctor in 2 days. Diagnosis     Clinical Impression:   1. Syncope, unspecified syncope type    2. Anxiety state        Disposition: Discharge home to follow-up with PCP in two days    Follow-up Information     Follow up With Specialties Details Why Lian Hoffman MD Internal Medicine In 2 days  Kapil Collins [phone]             Patient's Medications   Start Taking    No medications on file   Continue Taking    ASPIRIN 81 MG CHEWABLE TABLET    Take 81 mg by mouth daily (with breakfast). BUDESONIDE (ENTOCORT EC) 3 MG CAPSULE    Take 3 Caps by mouth daily. CARVEDILOL (COREG) 6.25 MG TABLET    Take 6.25 mg by mouth two (2) times daily (with meals). CHOLECALCIFEROL (VITAMIN D3) 1,000 UNIT TABLET    Take 2 Tabs by mouth two (2) times a day. [Request refills from PCP (Dr. Antony Gomez)]  Indications: VITAMIN D DEFICIENCY    CLOPIDOGREL (PLAVIX) 75 MG TABLET    Take 75 mg by mouth daily (with dinner). HYDRALAZINE (APRESOLINE) 25 MG TABLET    Take 75 mg by mouth three (3) times daily. MULTIVITAMIN (ONE A DAY) TABLET    Take 1 Tab by mouth daily. MYCOPHENOLIC ACID (MYFORTIC) 975 MG DELAYED RELEASE TABLET    Take 360 mg by mouth three (3) times daily. OLMESARTAN (BENICAR) 40 MG TABLET    Take 40 mg by mouth daily. RABEPRAZOLE (ACIPHEX) 20 MG TABLET    Take 20 mg by mouth daily. SIMVASTATIN (ZOCOR) 40 MG TABLET    Take 1 Tab by mouth nightly.  [Request refills from PCP (Dr. Antony Gomez)]  Indications: MYOCARDIAL INFARCTION PREVENTION   These Medications have changed    No medications on file   Stop Taking    No medications on file     Disclaimer: Sections of this note are dictated using utilizing voice recognition software. Minor typographical errors may be present. If questions arise, please do not hesitate to contact me or call our department. Jamie Hollingsworth [address] [place] [employer] DO [place]  Emergency Medicine Resident, PGY-1    I personally saw the patient. I have reviewed and agree with the resident's findings, including all diagnostic interpretations, and plans as written. I was present during the key portions of separately billed procedures.     [address] Vaughn, DO

## 2020-12-05 NOTE — ED NOTES
Pt discharge instructions reviewed with patient, patient denies questions and verbalizes understanding. IVs removed and all belongings with patient. Pt alert and oriented, no signs of distress. Pt assisted to wheelchair and transported to ED waiting room.

## 2021-02-15 ENCOUNTER — HOSPITAL ENCOUNTER (OUTPATIENT)
Dept: GENERAL RADIOLOGY | Age: 81
Discharge: HOME | End: 2021-02-15
Payer: MEDICARE

## 2021-02-15 DIAGNOSIS — M81.0 AGE RELATED OSTEOPOROSIS: ICD-10-CM

## 2021-02-15 PROCEDURE — 73630 X-RAY EXAM OF FOOT: CPT

## 2021-05-26 ENCOUNTER — HOSPITAL ENCOUNTER (OUTPATIENT)
Dept: WOMENS IMAGING | Age: 81
Discharge: HOME | End: 2021-05-26
Attending: INTERNAL MEDICINE
Payer: MEDICARE

## 2021-05-26 DIAGNOSIS — Z12.31 VISIT FOR SCREENING MAMMOGRAM: ICD-10-CM

## 2021-05-26 PROCEDURE — 77067 SCR MAMMO BI INCL CAD: CPT

## 2021-12-01 ENCOUNTER — TRANSCRIBE ORDER (OUTPATIENT)
Dept: SCHEDULING | Age: 81
End: 2021-12-01

## 2021-12-01 DIAGNOSIS — R60.0 LOCALIZED EDEMA: Primary | ICD-10-CM

## 2022-01-07 ENCOUNTER — HOSPITAL ENCOUNTER (OUTPATIENT)
Dept: VASCULAR SURGERY | Age: 82
Discharge: HOME | End: 2022-01-07
Attending: INTERNAL MEDICINE
Payer: MEDICARE

## 2022-01-07 DIAGNOSIS — R60.0 LOCALIZED EDEMA: ICD-10-CM

## 2022-01-07 PROCEDURE — 93970 EXTREMITY STUDY: CPT

## 2022-06-27 ENCOUNTER — HOSPITAL ENCOUNTER (OUTPATIENT)
Dept: PHYSICAL THERAPY | Age: 82
Discharge: HOME | End: 2022-06-27
Payer: MEDICARE

## 2022-06-27 PROCEDURE — 97530 THERAPEUTIC ACTIVITIES: CPT

## 2022-06-27 PROCEDURE — 97110 THERAPEUTIC EXERCISES: CPT

## 2022-06-27 PROCEDURE — 97162 PT EVAL MOD COMPLEX 30 MIN: CPT

## 2022-06-27 NOTE — PROGRESS NOTES
[facility]  [address] [facility] [address]  [place], [address]  [phone] fax    Plan of Care/ Statement of Necessity for Physical Therapy Services    Patient name: Gareth Espinosa Start of Care: 2022   Referral source: Merrick Pham MD : 1940    Medical Diagnosis: Difficulty in walking, not elsewhere classified [R26.2]  Muscle weakness (generalized) [M62.81]  Payor: Quique Shelby / Plan: [place] PART A & B / Product Type: Medicare /  Onset Date: 2021   Treatment Diagnosis: balance, gait instability    Prior Hospitalization: see medical history Provider#: [id number]   Medications: Verified on Patient summary List    Comorbidities: allergies, arthritis, CHF, GI disease, history of MI, HTN, osteoporosis, PVD, right talocrural fixation   Prior Level of Function: no limitations, walking dog ~20 minutes 2x/day     The Plan of Care and following information is based on the information from the initial evaluation. Assessment/ key information: Patient is an 81year old female presenting to clinic today with balance deficits and gait instability. Patient reports having COVID and PNA in 2021 which limited usual activities. In October, she began to return to daily activities and fell backwards when ascending the stairs fracturing her left radius and ulna requiring fixation. Patient has minimal confidence in balance and is fearful of falling with most activities. Patient reports she had no limitations prior to onset last Fall and was walking her dog ~20 minutes 2x/day. Patient currently limiting dog walking to 5 minutes in her backyard, has decreased balance with quick movements, and difficulty with stair negotiation. Patient with decreased balance noted in narrow base of support, on uneven surface, and with eyes closed. Her DGI score today was 10/24 indicative of increased fall risk.  Patient also demonstrates decreased right hip and knee strength compared to left, likely due to right talocrural fixation ~50 years ago with decreased general mobility and strength. She would benefit from skilled PT 2x/week for 4 weeks to address the above limitations and promote improved balance and confidence with daily activities and decrease fall risk. Evaluation Complexity History HIGH Complexity :3+ comorbidities / personal factors will impact the outcome/ POC ; Examination MEDIUM Complexity : 3 Standardized tests and measures addressing body structure, function, activity limitation and / or participation in recreation  ;Presentation MEDIUM Complexity : Evolving with changing characteristics  ; Clinical Decision Making MEDIUM Complexity : FOTO score of 26-74  Overall Complexity Rating: MEDIUM  Problem List: pain affecting function, decrease ROM, decrease strength, impaired gait/ balance, decrease ADL/ functional abilitiies, decrease activity tolerance, decrease flexibility/ joint mobility and decrease transfer abilities   Treatment Plan may include any combination of the following: Therapeutic exercise, Therapeutic activities, Neuromuscular re-education, Physical agent/modality, Gait/balance training, Manual therapy, Patient education, Self Care training, Functional mobility training, Home safety training and Stair training  Patient / Family readiness to learn indicated by: asking questions, trying to perform skills and interest  Persons(s) to be included in education: patient (P)  Barriers to Learning/Limitations: None  Patient Goal (s): increase balance and increase strength  Patient Self Reported Health Status: good  Rehabilitation Potential: good    Short Term Goals: To be accomplished in 2 weeks:  1. Patient will report performance of home exercise program 4 of 7 days in the next week demonstrating compliance to therapy program and progress towards independent management of symptoms. Long Term Goals: To be accomplished in 4 weeks:  1.  Patient will increase DGI to at least 19/24 to demonstrate decreased fall risk. 2. Patient will increase right hip strength and knee strength to at least 4+/5 to improve ease with transfers and stair negotiation. 3. Patient will report no difficulty walking dog for 15 minutes to promote return to PLOF. 4. Patient will increase MSR to at least 30\" to improve balance and decrease fall risk. 5. Patient will increase FOTO score to at least 59 to demonstrate improved ease with household activities. Frequency / Duration: Patient to be seen 2 times per week for 4 weeks. Patient/ Caregiver education and instruction: Diagnosis, prognosis, activity modification and exercises   [x]  Plan of care has been reviewed with PTA Ginny Riedel, PT, DPT 6/27/2022 12:32 PM    ________________________________________________________________________    I certify that the above Therapy Services are being furnished while the patient is under my care. I agree with the treatment plan and certify that this therapy is necessary.     [de-identified] Signature:____________Date:_________TIME:________     Sunil Montgomery MD  ** Signature, Date and Time must be completed for valid certification **    Please sign and return to [facility] [address] [place], [place] [address] [facility] [place] [address] [place] [address] Str.  [phone] fax

## 2022-06-27 NOTE — PROGRESS NOTES
PT DAILY TREATMENT NOTE     Patient Name: Maryhelen Bosworth  Date:2022  : 1940  [x]  Patient  Verified  Payor: Niels Winters / Plan: [place] / Product Type: Medicare /    In time: 421  Out time:  Total Treatment Time (min): 45  Visit #: 1 of 8    Medicare/BCBS Only   Total Timed Codes (min):  30 1:1 Treatment Time:  45       Treatment Area: Difficulty in walking, not elsewhere classified [R26.2]  Muscle weakness (generalized) [M62.81]    SUBJECTIVE  Pain Level (0-10 scale): 0  Any medication changes, allergies to medications, adverse drug reactions, diagnosis change, or new procedure performed?: [x] No    [] Yes (see summary sheet for update)  Subjective functional status/changes:   [] No changes reported  Patient reports having COVID and later diagnosed with PNA in 2021 which is when she stopped some of her normal activities. In October, she was trying to reintroduce activities again and fell backwards when ascending the stairs fracturing her right radius and ulnar requiring fixation. She has minimal confidence in her balance and is fearful of falling. OBJECTIVE    15 min [x]Eval                  []Re-Eval       15 min Therapeutic Exercise:  [x] See flow sheet : Patient provided printed HEP to begin addressing impairments. Patient provided demonstration of exercises and rationale for each exercise to improve compliance to HEP. Education provided on importance of compliance to HEP for improved carry over between therapy session. Rationale: increase ROM, increase strength and improve coordination to improve the patients ability to perform ADLs and self care tasks with greater ease     15 min Therapeutic Activity:  [x]  See flow sheet : Patient education provided on treatment and plan of care. Educated on 3 systems for balance training.     Rationale: increase strength and improve coordination  to improve the patients ability to perform functional tasks with greater ease With   [] TE   [] TA   [] neuro   [] other: Patient Education: [x] Review HEP    [] Progressed/Changed HEP based on:   [] positioning   [] body mechanics   [] transfers   [] heat/ice application    [] other:      Other Objective/Functional Measures: see paper chart for evaluation form     Pain Level (0-10 scale) post treatment: 0    ASSESSMENT/Changes in Function: Patient is an 81year old female presenting to clinic today with balance deficits and gait instability. Patient reports having COVID and PNA in 09/2021 which limited usual activities. In October, she began to return to daily activities and fell backwards when ascending the stairs fracturing her left radius and ulna requiring fixation. Patient has minimal confidence in balance and is fearful of falling with most activities. Patient reports she had no limitations prior to onset last Fall and was walking her dog ~20 minutes 2x/day. Patient currently limiting dog walking to 5 minutes in her backyard, has decreased balance with quick movements, and difficulty with stair negotiation. Patient with decreased balance noted in narrow base of support, on uneven surface, and with eyes closed. Her DGI score today was 10/24 indicative of increased fall risk. Patient also demonstrates decreased right hip and knee strength compared to left, likely due to right talocrural fixation ~50 years ago with decreased general mobility and strength. She would benefit from skilled PT 2x/week for 4 weeks to address the above limitations and promote improved balance and confidence with daily activities and decrease fall risk.      Patient will continue to benefit from skilled PT services to modify and progress therapeutic interventions, address functional mobility deficits, address ROM deficits, address strength deficits, analyze and address soft tissue restrictions, analyze and cue movement patterns, analyze and modify body mechanics/ergonomics, assess and modify postural abnormalities, address imbalance/dizziness and instruct in home and community integration to attain remaining goals. []  See Plan of Care  []  See progress note/recertification  []  See Discharge Summary         Progress towards goals / Updated goals:  Short Term Goals: To be accomplished in 2 weeks:  1. Patient will report performance of home exercise program 4 of 7 days in the next week demonstrating compliance to therapy program and progress towards independent management of symptoms. Eval: HEP provided and instructed     Long Term Goals: To be accomplished in 4 weeks:  1. Patient will increase DGI to at least 19/24 to demonstrate decreased fall risk. Eval: 10/24  2. Patient will increase right hip strength and knee strength to at least 4+/5 to improve ease with transfers and stair negotiation. Eval: 4-/5 grossly   3. Patient will report no difficulty walking dog for 15 minutes to promote return to PLOF. Eval: limiting to 5' in backyard only   4. Patient will increase MSR to at least 30\" to improve balance and decrease fall risk. Eval: 30\" romberg EO  5. Patient will increase FOTO score to at least 59 to demonstrate improved ease with household activities. Eval: 54    PLAN  []  Upgrade activities as tolerated     []  Continue plan of care  []  Update interventions per flow sheet       []  Discharge due to:_  []  Other:_      Tra Russell, PT, DPT  6/27/2022  12:24 PM    No future appointments.

## 2022-06-29 ENCOUNTER — HOSPITAL ENCOUNTER (OUTPATIENT)
Dept: PHYSICAL THERAPY | Age: 82
Discharge: HOME | End: 2022-06-29
Payer: MEDICARE

## 2022-06-29 PROCEDURE — 97112 NEUROMUSCULAR REEDUCATION: CPT

## 2022-06-29 PROCEDURE — 97110 THERAPEUTIC EXERCISES: CPT

## 2022-06-29 PROCEDURE — 97116 GAIT TRAINING THERAPY: CPT

## 2022-06-29 NOTE — PROGRESS NOTES
PT DAILY TREATMENT NOTE     Patient Name: Gauri Radford  Date:2022  : 1940  [x]  Patient  Verified  Payor: [place] MEDICARE / Plan: [place] MEDICARE PART A & B / Product Type: Medicare /    In time:209  Out time: 255  Total Treatment Time (min): 46  Visit #: 2 of 8    Medicare/BCBS Only   Total Timed Codes (min):  46 1:1 Treatment Time:  46       Treatment Area: Difficulty in walking, not elsewhere classified [R26.2]  Muscle weakness (generalized) [M62.81]    SUBJECTIVE  Pain Level (0-10 scale): 0/10  Any medication changes, allergies to medications, adverse drug reactions, diagnosis change, or new procedure performed?: [x] No    [] Yes (see summary sheet for update)  Subjective functional status/changes:   [] No changes reported  \" Endurance isn't very good\"    OBJECTIVE      20 min Therapeutic Exercise:  [x] See flow sheet : LE/Core strength per F/S   Rationale: increase strength to improve the patients ability to perform ADL's and self care with increased ease. 16 min Neuromuscular Re-education:  -  See flow sheet : Gait and Balance training   Rationale: improve coordination, improve balance and increase proprioception  to improve the patients ability to ambulate and perform ADL's with increased stabilization. 10 min Gait Trainin ft forward/ side step, fast/slow, pivot turns, step overs. Rationale: to prevent falls, increase stability. With   [] TE   [] TA   [] neuro   [] other: Patient Education: [x] Review HEP    [] Progressed/Changed HEP based on:   [] positioning   [] body mechanics   [] transfers   [] heat/ice application    [] other:      Other Objective/Functional Measures: Challenged with Right lateral step ups. Pain Level (0-10 scale) post treatment: 0/10    ASSESSMENT/Changes in Function: Patient challenged with endurance requiring 4 rest breaks during there ex. She was most challenged with balance during EC, dynamic balance and Right LE strength. Patient will continue to benefit from skilled PT services to modify and progress therapeutic interventions, address functional mobility deficits, address ROM deficits, address strength deficits, analyze and address soft tissue restrictions, analyze and cue movement patterns, analyze and modify body mechanics/ergonomics and assess and modify postural abnormalities to attain remaining goals. [x]  See Plan of Care  []  See progress note/recertification  []  See Discharge Summary         Progress towards goals / Updated goals:  Short Term Goals: To be accomplished in 2 weeks:  1. Patient will report performance of home exercise program 4 of 7 days in the next week demonstrating compliance to therapy program and progress towards independent management of symptoms. Eval: HEP provided and instructed    Current: Initiated HEP. 6/29/22     Long Term Goals: To be accomplished in 4 weeks:  1. Patient will increase DGI to at least 19/24 to demonstrate decreased fall risk. Eval: 10/24  2. Patient will increase right hip strength and knee strength to at least 4+/5 to improve ease with transfers and stair negotiation. Eval: 4-/5 grossly   3. Patient will report no difficulty walking dog for 15 minutes to promote return to PLOF. Eval: limiting to 5' in backyard only   4. Patient will increase MSR to at least 30\" to improve balance and decrease fall risk. Eval: 30\" romberg EO  5. Patient will increase FOTO score to at least 59 to demonstrate improved ease with household activities.                Eval: 54    PLAN  []  Upgrade activities as tolerated     [x]  Continue plan of care  []  Update interventions per flow sheet       []  Discharge due to:_  []  Other:_      Blanca Rhoades, PT 6/29/2022  12:41 PM    Future Appointments   Date Time Provider Justo Altman   6/29/2022  2:15 PM Jeff Zamorano, PT [facility]   7/6/2022  3:45 PM Dave Malave, PT [facility]   7/8/2022  2:15 PM Mark Rosas, PT [facility]   7/13/2022 10:30 AM Sourav Benavides, PT [facility]   7/15/2022 10:30 AM Sourav Benavides, PT [facility]   7/18/2022 10:45 AM Gallegos Keas [facility]   7/22/2022 10:30 AM Gallegos Keas [facility]

## 2022-07-06 ENCOUNTER — HOSPITAL ENCOUNTER (OUTPATIENT)
Dept: PHYSICAL THERAPY | Age: 82
Discharge: HOME | End: 2022-07-06
Payer: MEDICARE

## 2022-07-06 PROCEDURE — 97110 THERAPEUTIC EXERCISES: CPT

## 2022-07-06 PROCEDURE — 97116 GAIT TRAINING THERAPY: CPT

## 2022-07-06 PROCEDURE — 97112 NEUROMUSCULAR REEDUCATION: CPT

## 2022-07-06 NOTE — PROGRESS NOTES
PT DAILY TREATMENT NOTE 10-18    Patient Name: Mendy Lowry  Date:2022  : 1940  [x]  Patient  Verified  Payor: [place] MEDICARE / Plan: [place] MEDICARE PART A & B / Product Type: Medicare /    In time:345  Out time:429  Total Treatment Time (min): 44  Visit #: 3 of 8    Medicare/BCBS Only   Total Timed Codes (min):  44 1:1 Treatment Time:  44       Treatment Area: Difficulty in walking, not elsewhere classified [R26.2]  Muscle weakness (generalized) [M62.81]    SUBJECTIVE  Pain Level (0-10 scale): 0  Any medication changes, allergies to medications, adverse drug reactions, diagnosis change, or new procedure performed?: [x] No    [] Yes (see summary sheet for update)  Subjective functional status/changes:   [] No changes reported  I'm doing ok. OBJECTIVE    21 min Therapeutic Exercise:  [x] See flow sheet :   Rationale: increase strength, improve coordination, improve balance and increase proprioception to improve the patients ability to perform daily activities      13 min Neuromuscular Re-education:  [x]  See flow sheet :   Rationale: increase strength, improve coordination, improve balance and increase proprioception  to improve the patients stabiltiy for daily activities  10 min Dynamic gait: marching, side stepping; loi stepovers   Rationale: to improve stability for community ambulation    With   [] TE   [] TA   [] neuro   [] other: Patient Education: [x] Review HEP    [] Progressed/Changed HEP based on:   [] positioning   [] body mechanics   [] transfers   [] heat/ice application    [] other:      Other Objective/Functional Measures:      Pain Level (0-10 scale) post treatment: 0    ASSESSMENT/Changes in Function: LOB once with small loi step overs; required CGA with all dynamic gait.      Patient will continue to benefit from skilled PT services to modify and progress therapeutic interventions, address strength deficits, analyze and cue movement patterns and address imbalance/dizziness to attain remaining goals. []  See Plan of Care  []  See progress note/recertification  []  See Discharge Summary         Progress towards goals / Updated goals:  Short Term Goals: To be accomplished in 2 weeks:  1. Patient will report performance of home exercise program 4 of 7 days in the next week demonstrating compliance to therapy program and progress towards independent management of symptoms.             Eval: HEP provided and instructed               Current: Initiated HEP. 6/29/22     Long Term Goals: To be accomplished in 4 weeks:  1. Patient will increase DGI to at least 19/24 to demonstrate decreased fall risk.              Eval: 10/24  2. Patient will increase right hip strength and knee strength to at least 4+/5 to improve ease with transfers and stair negotiation.               Eval: 4-/5 grossly   3. Patient will report no difficulty walking dog for 15 minutes to promote return to PLOF.             Eval: limiting to 5' in backyard only   4. Patient will increase MSR to at least 30\" to improve balance and decrease fall risk.               Eval: 30\" romberg EO   Current: goal met 7/6/22  5. Patient will increase FOTO score to at least 59 to demonstrate improved ease with household activities.                Eval: 47    PLAN  [x]  Upgrade activities as tolerated     []  Continue plan of care  []  Update interventions per flow sheet       []  Discharge due to:_  []  Other:_      Zaria Herrera, MPT, CMTPT 7/6/2022  8:52 AM    Future Appointments   Date Time Provider Justo Altman   7/6/2022  3:45 PM Lizeth Smith, PT [facility]   7/8/2022  2:15 PM Eddie Smith, PT [facility]   7/13/2022 10:30 AM Lizeth Smith, PT [facility]   7/15/2022 10:30 AM Lizeth Smith, PT [facility]   7/18/2022 10:45 AM Swathi Smack [facility]   7/22/2022 10:30 AM Sangita Carlos [facility]

## 2022-07-08 ENCOUNTER — HOSPITAL ENCOUNTER (OUTPATIENT)
Dept: PHYSICAL THERAPY | Age: 82
Discharge: HOME | End: 2022-07-08
Payer: MEDICARE

## 2022-07-08 PROCEDURE — 97112 NEUROMUSCULAR REEDUCATION: CPT

## 2022-07-08 PROCEDURE — 97110 THERAPEUTIC EXERCISES: CPT

## 2022-07-08 NOTE — PROGRESS NOTES
PT DAILY TREATMENT NOTE     Patient Name: King Miranda  Date:2022  : 1940  [x]  Patient  Verified  Payor: [place] MEDICARE / Plan: [place] MEDICARE PART A & B / Product Type: Medicare /    In time: 2:15 PM  Out time:2:59 PM  Total Treatment Time (min): 44  Visit #: 4 of 8    Medicare/BCBS Only   Total Timed Codes (min):  44 1:1 Treatment Time:  44       Treatment Area: Difficulty in walking, not elsewhere classified [R26.2]  Muscle weakness (generalized) [M62.81]    SUBJECTIVE  Pain Level (0-10 scale): 0  Any medication changes, allergies to medications, adverse drug reactions, diagnosis change, or new procedure performed?: [x] No    [] Yes (see summary sheet for update)  Subjective functional status/changes:   [] No changes reported   Has been feeling a little sore after sessions but \"good sore\"    OBJECTIVE    20 min Therapeutic Exercise:  [x] See flow sheet :   Rationale: increase ROM and increase strength to improve the patients ability to perform functional transfers and negotiate stairs. 24 min Neuromuscular Re-education:  [x]  See flow sheet :   Rationale: improve coordination, improve balance and increase proprioception  to improve the patients ability to navigate dynamic environments.           With   [x] TE   [] TA   [x] neuro   [] other: Patient Education: [] Review HEP    [] Progressed/Changed HEP based on:   [x] positioning   [x] body mechanics   [] transfers   [] heat/ice application    [] other:      Other Objective/Functional Measures:   Dynamic gait activities: head turns horizontal/vertical, speed changes, resisted walking, narrow base of support ambulation     - Addition of anterior/posterior weight shifts and mini squats on [employer]ire[employer]  - Addition of resistance to standing hip 3-way    Pain Level (0-10 scale) post treatment: 0    ASSESSMENT/Changes in Function: Patient showing good use of ankle strategy in static balance activities, responds well to postural cues demonstrating less sway. Appropriately challenged with dynamic gait and progressions. Patient will continue to benefit from skilled PT services to modify and progress therapeutic interventions, address functional mobility deficits, address ROM deficits, address strength deficits, analyze and address soft tissue restrictions, analyze and cue movement patterns and analyze and modify body mechanics/ergonomics to attain remaining goals. Progress towards goals / Updated goals:  Short Term Goals: To be accomplished in 2 weeks:  1. Patient will report performance of home exercise program 4 of 7 days in the next week demonstrating compliance to therapy program and progress towards independent management of symptoms.             Eval: HEP provided and instructed               [facility]: Initiated HEP. 6/29/22     Long Term Goals: To be accomplished in 4 weeks:  1. Patient will increase DGI to at least 19/24 to demonstrate decreased fall risk.              Eval: 10/24  2. Patient will increase right hip strength and knee strength to at least 4+/5 to improve ease with transfers and stair negotiation.               Eval: 4-/5 grossly   3. Patient will report no difficulty walking dog for 15 minutes to promote return to PLOF.             Eval: limiting to 5' in backyard only   4. Patient will increase MSR to at least 30\" to improve balance and decrease fall risk.               Eval: 30\" romberg EO              Current: goal met 7/6/22  5. Patient will increase FOTO score to at least 59 to demonstrate improved ease with household activities.                Eval: 47    PLAN  [x]  Upgrade activities as tolerated     []  Continue plan of care  []  Update interventions per flow sheet       []  Discharge due to:_  []  Other:_      Marie Schwarz, PT 7/8/2022  1:28 PM    Future Appointments   Date Time Provider Justo Altman   7/8/2022  2:15 PM Royce Arias, PT [facility]   7/13/2022 10:30 AM Jhon Sherman, PT [facility]   7/15/2022 10:30 AM Dave Malave PT [facility]   7/18/2022 10:45 AM Walt Law [facility]   7/22/2022 10:30 AM Toro Or [facility]

## 2022-07-13 ENCOUNTER — APPOINTMENT (OUTPATIENT)
Dept: PHYSICAL THERAPY | Age: 82
End: 2022-07-13
Payer: MEDICARE

## 2022-07-15 ENCOUNTER — APPOINTMENT (OUTPATIENT)
Dept: PHYSICAL THERAPY | Age: 82
End: 2022-07-15
Payer: MEDICARE

## 2022-07-18 ENCOUNTER — APPOINTMENT (OUTPATIENT)
Dept: PHYSICAL THERAPY | Age: 82
End: 2022-07-18
Payer: MEDICARE

## 2022-07-22 ENCOUNTER — HOSPITAL ENCOUNTER (OUTPATIENT)
Dept: PHYSICAL THERAPY | Age: 82
Discharge: HOME | End: 2022-07-22
Payer: MEDICARE

## 2022-07-22 PROCEDURE — 97110 THERAPEUTIC EXERCISES: CPT

## 2022-07-22 PROCEDURE — 97112 NEUROMUSCULAR REEDUCATION: CPT

## 2022-07-22 NOTE — PROGRESS NOTES
PT DAILY TREATMENT NOTE     Patient Name: Vidhi Schaffer  Date:2022  : 1940  [x]  Patient  Verified  Payor: [place] MEDICARE / Plan: [place] MEDICARE PART A & B / Product Type: Medicare /    In time:1030  Out time:1100  Total Treatment Time (min): 30  Visit #: 5 of 8    Medicare/BCBS Only   Total Timed Codes (min):  30 1:1 Treatment Time:  30       Treatment Area: Difficulty in walking, not elsewhere classified [R26.2]  Muscle weakness (generalized) [M62.81]    SUBJECTIVE  Pain Level (0-10 scale): 3-4  Any medication changes, allergies to medications, adverse drug reactions, diagnosis change, or new procedure performed?: [x] No    [] Yes (see summary sheet for update)  Subjective functional status/changes:   [] No changes reported  \"Some in the right hip and knee. It's worse when I lay down in bed at night. \" She reports being hospitalized last week for 2 nights due to cardiac issues. OBJECTIVE      20 min Therapeutic Exercise:  [x] See flow sheet :   Rationale: increase ROM, increase strength, and improve coordination to improve the patients ability to perform ADLs and self care tasks with greater ease     10 min Neuromuscular Re-education:  [x]  See flow sheet : DGI   Rationale: increase strength, improve coordination, and improve balance  to improve the patients ability to perform functional tasks with improved stabilization           With   [] TE   [] TA   [] neuro   [] other: Patient Education: [x] Review HEP    [] Progressed/Changed HEP based on:   [] positioning   [] body mechanics   [] transfers   [] heat/ice application    [] other:      Other Objective/Functional Measures: right hip flexion 4/5, right knee extension 4/5, right knee flexion 4/5 ; FOTO = 46    Limited session today due to increased fatigue and discomfort in right hip/knee     Pain Level (0-10 scale) post treatment: 3    ASSESSMENT/Changes in Function: Patient presents today for re-certification.  She reports ~50% improvement in her balance since start of care, though since she was hospitalized last week for 2 nights due to cardiac monitoring she has felt more off-balance and now rates her progress at 40%. She does demonstrate improved balance with MSR and improved DGI from [facility] demonstrating decreased fall risk. She would benefit from continued skilled PT 2x/week for 4 weeks to further improve balance, right LE strength, endurance, and confidence with mobility to continue to improve ease with daily activities and community ambulation. Patient will continue to benefit from skilled PT services to modify and progress therapeutic interventions, address functional mobility deficits, address ROM deficits, address strength deficits, analyze and address soft tissue restrictions, analyze and cue movement patterns, analyze and modify body mechanics/ergonomics, assess and modify postural abnormalities, address imbalance/dizziness, and instruct in home and community integration to attain remaining goals. []  See Plan of Care  [x]  See progress note/recertification  []  See Discharge Summary         Progress towards goals / Updated goals:  Short Term Goals: To be accomplished in 2 weeks:  1. Patient will report performance of home exercise program 4 of 7 days in the next week demonstrating compliance to therapy program and progress towards independent management of symptoms. Eval: HEP provided and instructed               Current: Initiated HEP. 6/29/22     Long Term Goals: To be accomplished in 4 weeks:  1. Patient will increase DGI to at least 19/24 to demonstrate decreased fall risk. Eval: 10/24   Current: progressing 7/22/2022 - 14/24   2. Patient will increase right hip strength and knee strength to at least 4+/5 to improve ease with transfers and stair negotiation. Eval: 4-/5 grossly    Current: progressing - right hip flexion 4/5, right knee extension 4/5, right knee flexion 4/5   3.  Patient will report no difficulty walking dog for 15 minutes to promote return to PLOF. Eval: limiting to 5' in backyard only  Current: remains 7/22/2022  4. Patient will increase MSR to at least 30\" to improve balance and decrease fall risk. Eval: 30\" romberg EO              Current: goal met 7/6/22  5. Patient will increase FOTO score to at least 59 to demonstrate improved ease with household activities. Eval: 54   Current: regressed 7/22/2022 - 46     PLAN  []  Upgrade activities as tolerated     [x]  Continue plan of care  []  Update interventions per flow sheet       []  Discharge due to:_  []  Other:_      Julius Segundo, PT, DPT 7/22/2022  10:34 AM    No future appointments.

## 2022-07-22 NOTE — PROGRESS NOTES
[facility]  [address]  [facility], [address]  [phone] fax    Continued Plan of Care/ Re-certification for Physical Therapy Services    Patient name: Griffin Dick Start of Care: 2022   Referral source: Toby Durant MD : 1940   Medical/Treatment Diagnosis: Difficulty in walking, not elsewhere classified [R26.2]  Muscle weakness (generalized) [M62.81]  Payor: Lola Sullivan / Plan: [place] MEDICARE PART A & B / Product Type: Medicare /  Onset Date:2021     Prior Hospitalization: see medical history Provider#: [id number]   Medications: Verified on Patient Summary List     Comorbidities: allergies, arthritis, CHF, GI disease, history of MI, HTN, osteoporosis, PVD, right talocrural fixation   Prior Level of Function: no limitations, walking dog ~20 minutes 2x/day  Visits from Start of Care: 5    Missed Visits: 0    The Plan of Care and following information is based on the patient's current status:  Short Term Goals: To be accomplished in 2 weeks:  1. Patient will report performance of home exercise program 4 of 7 days in the next week demonstrating compliance to therapy program and progress towards independent management of symptoms. Eval: HEP provided and instructed               Current: Initiated HEP     Long Term Goals: To be accomplished in 4 weeks:  1. Patient will increase DGI to at least 19/24 to demonstrate decreased fall risk. Eval: 10/24              Current: progressing -    2. Patient will increase right hip strength and knee strength to at least 4+/5 to improve ease with transfers and stair negotiation. Eval: 4-/5 grossly               Current: progressing - right hip flexion 4/5, right knee extension 4/5, right knee flexion 4/5   3. Patient will report no difficulty walking dog for 15 minutes to promote return to PLOF.               Eval: limiting to 5' in backyard only  Current: remains   4. Patient will increase MSR to at least 30\" to improve balance and decrease fall risk. Eval: 30\" romberg EO              Current: goal met   5. Patient will increase FOTO score to at least 59 to demonstrate improved ease with household activities. Eval: 54              Current: regressed - 55     Key functional changes: MSR = 30\" bilaterally, right hip flexion 4/5, right knee extension 4/5, right knee flexion 4/5, DGI Improved by 4 points     Problems/ barriers to goal attainment: gap in therapy while patient was hospitalized for cardiac monitoring      Problem List: pain affecting function, decrease ROM, decrease strength, impaired gait/ balance, decrease ADL/ functional abilitiies, decrease activity tolerance, decrease flexibility/ joint mobility, and decrease transfer abilities    Treatment Plan: Therapeutic exercise, Therapeutic activities, Neuromuscular re-education, Physical agent/modality, Gait/balance training, Manual therapy, Patient education, Self Care training, Functional mobility training, Home safety training, and Stair training     Patient Goal (s) has been updated and includes: increase balance and increase strength     Goals for this certification period to be accomplished in 4 weeks:  1. Patient will increase DGI to at least 19/24 to demonstrate decreased fall risk. Re-cert: 96/62   2. Patient will increase right hip strength and knee strength to at least 4+/5 to improve ease with transfers and stair negotiation. Re-cert: right hip flexion 4/5, right knee extension 4/5, right knee flexion 4/5   3. Patient will report no difficulty walking dog for 15 minutes to promote return to PLOF. Re-cert: remains - limited to 5' in backyard   4. Patient will increase tandem to at least 30\" to improve balance and decrease fall risk. Re-cert: 51\" MSR EO  5.  Patient will increase FOTO score to at least 59 to demonstrate improved ease with household activities. Re-cert: regressed - 46     Frequency / Duration: Patient to be seen 2 times per week for 4 weeks:    Assessment / Recommendations:Patient presents today for re-certification. She reports ~50% improvement in her balance since start of care, though since she was hospitalized last week for 2 nights due to cardiac monitoring she has felt more off-balance and now rates her progress at 40%. She does demonstrate improved balance with MSR and improved DGI from [facility] demonstrating decreased fall risk. She would benefit from continued skilled PT 2x/week for 4 weeks to further improve balance, right LE strength, endurance, and confidence with mobility to continue to improve ease with daily activities and community ambulation. Certification Period: 7/25/2022 - 8/23/2022    Pina Fontaine PT, DPT 7/22/2022 11:01 AM    ________________________________________________________________________  I certify that the above Therapy Services are being furnished while the patient is under my care. I agree with the treatment plan and certify that this therapy is necessary. [] I have read the above and request that my patient continue as recommended.   [] I have read the above report and request that my patient continue therapy with the following changes/special instructions: _____________________________________________  [] I have read the above report and request that my patient be discharged from therapy    Physician's Signature:____________Date:_________TIME:________     Merrick Pham MD  ** Signature, Date and Time must be completed for valid certification **    Please sign and return to [facility] [address] [place] [address] & [facility]  [address]  [place], [facility] [address]  [phone] fax

## 2022-07-26 ENCOUNTER — HOSPITAL ENCOUNTER (OUTPATIENT)
Dept: PHYSICAL THERAPY | Age: 82
Discharge: HOME | End: 2022-07-26
Payer: MEDICARE

## 2022-07-26 PROCEDURE — 97110 THERAPEUTIC EXERCISES: CPT

## 2022-07-26 PROCEDURE — 97112 NEUROMUSCULAR REEDUCATION: CPT

## 2022-07-26 NOTE — PROGRESS NOTES
PT DAILY TREATMENT NOTE     Patient Name: Sara Counter  Date:2022  : 1940  [x]  Patient  Verified  Payor: VA MEDICARE / Plan: VA MEDICARE PART A & B / Product Type: Medicare /    In time: 9:50 AM  Out time:10:29 AM  Total Treatment Time (min): 39  Visit #: 1 of 8    Medicare/BCBS Only   Total Timed Codes (min):  39 1:1 Treatment Time:  39       Treatment Area: Difficulty in walking, not elsewhere classified [R26.2]  Muscle weakness (generalized) [M62.81]    SUBJECTIVE  Pain Level (0-10 scale): 0  Any medication changes, allergies to medications, adverse drug reactions, diagnosis change, or new procedure performed?: [x] No    [] Yes (see summary sheet for update)  Subjective functional status/changes:   [] No changes reported  Says she was diagnosed with A-fib while in the hospital and has since been put on medication. Says she feels more easily fatigued since her stay in the hospital.     OBJECTIVE    16 min Therapeutic Exercise:  [x] See flow sheet :   Rationale: increase ROM and increase strength to improve the patients ability to negotiate stairs and perform functional transfers. 23 min Neuromuscular Re-education:  [x]  See flow sheet :   Rationale: improve coordination, improve balance, and increase proprioception  to improve the patients ability to navigate dynamic environments with greater stability and safety. With   [] TE   [] TA   [] neuro   [] other: Patient Education: [x] Review HEP    [] Progressed/Changed HEP based on:   [] positioning   [] body mechanics   [] transfers   [] heat/ice application    [] other:      Other Objective/Functional Measures:   No progressions this date to assess tolerance to resuming exercise      Pain Level (0-10 scale) post treatment: 0    ASSESSMENT/Changes in Function: No abnormal symptoms throughout session, just c/o fatigue with cycling.  Antigravity righting reaction improved with repetition in loi activity despite patient reporting hesitance with activity. Leaves the clinic in no distress. Patient will continue to benefit from skilled PT services to modify and progress therapeutic interventions, address functional mobility deficits, analyze and cue movement patterns, analyze and modify body mechanics/ergonomics, assess and modify postural abnormalities, and address imbalance/dizziness to attain remaining goals. Progress towards goals / Updated goals:  1. Patient will increase DGI to at least 19/24 to demonstrate decreased fall risk. Re-cert: 51/73   2. Patient will increase right hip strength and knee strength to at least 4+/5 to improve ease with transfers and stair negotiation. Re-cert: right hip flexion 4/5, right knee extension 4/5, right knee flexion 4/5   3. Patient will report no difficulty walking dog for 15 minutes to promote return to PLOF. Re-cert: remains - limited to 5' in backyard   4. Patient will increase tandem to at least 30\" to improve balance and decrease fall risk. Re-cert: 01\" MSR EO  5. Patient will increase FOTO score to at least 59 to demonstrate improved ease with household activities.                Re-cert: regressed - 55     PLAN  [x]  Upgrade activities as tolerated     []  Continue plan of care  []  Update interventions per flow sheet       []  Discharge due to:_  []  Other:_      Hamilton Beard, PT 7/26/2022  8:04 AM    Future Appointments   Date Time Provider Justo Altman   7/26/2022  9:45 AM Jyoti Sebastian, PT [facility]   7/29/2022  1:30 PM Jyoti Sebastian, PT [facility]   8/2/2022 11:15 AM Janece Beans [facility]   8/4/2022  1:45 PM Janece Beans [facility]   8/9/2022 12:45 PM Janece Beans [facility]   8/11/2022 12:00 PM Charlestonjohnathan Honeycutt, PT [facility]   8/23/2022 11:15 AM Fabienne Boothe [facility]   8/26/2022 12:45 PM Fabienne Boothe [facility]

## 2022-07-28 ENCOUNTER — TELEPHONE (OUTPATIENT)
Dept: PHYSICAL THERAPY | Age: 82
End: 2022-07-28

## 2022-07-29 ENCOUNTER — TELEPHONE (OUTPATIENT)
Dept: PHYSICAL THERAPY | Age: 82
End: 2022-07-29

## 2022-08-02 ENCOUNTER — HOSPITAL ENCOUNTER (OUTPATIENT)
Dept: PHYSICAL THERAPY | Age: 82
Discharge: HOME | End: 2022-08-02
Payer: MEDICARE

## 2022-08-02 PROCEDURE — 97110 THERAPEUTIC EXERCISES: CPT

## 2022-08-02 PROCEDURE — 97112 NEUROMUSCULAR REEDUCATION: CPT

## 2022-08-02 NOTE — PROGRESS NOTES
PT DAILY TREATMENT NOTE     Patient Name: Gareth Espinosa  Date:2022  : 1940  [x]  Patient  Verified  Payor: VA MEDICARE / Plan: VA MEDICARE PART A & B / Product Type: Medicare /    In time:1115  Out time:1158  Total Treatment Time (min): 43  Visit #: 2 of 8    Medicare/BCBS Only   Total Timed Codes (min):  43 1:1 Treatment Time:  43       Treatment Area: Difficulty in walking, not elsewhere classified [R26.2]  Muscle weakness (generalized) [M62.81]    SUBJECTIVE  Pain Level (0-10 scale): 4 - knees  Any medication changes, allergies to medications, adverse drug reactions, diagnosis change, or new procedure performed?: [x] No    [] Yes (see summary sheet for update)  Subjective functional status/changes:   [] No changes reported  Patient reports she is feeling better since last visit regarding general health/fatigue though reports she was feeling lightheaded and general dizziness on her way to her last PT appointment which she turned around and went home.      OBJECTIVE    18 min Therapeutic Exercise:  [x] See flow sheet :   Rationale: increase ROM, increase strength, and improve coordination to improve the patients ability to perform ADLs and self care tasks with greater ease      24 min Neuromuscular Re-education:  [x]  See flow sheet : step taps, airex balance activities, sit<>stands, DD core stabilization series    Rationale: increase strength, improve coordination, improve balance, and increase proprioception  to improve the patients ability to perform functional tasks with improved stabilization and control           With   [] TE   [] TA   [] neuro   [] other: Patient Education: [x] Review HEP    [] Progressed/Changed HEP based on:   [] positioning   [] body mechanics   [] transfers   [] heat/ice application    [] other:      Other Objective/Functional Measures: left tandem 10\", right tandem 15\" ; added sit<>stand from elevated surface without UE assist, added romberg on airex with head turns (up/down and side/side)    Pain Level (0-10 scale) post treatment: 0    ASSESSMENT/Changes in Function: Patient requiring intermittent UE support on parallel bars for balance activities, though improved since start of care. Patient challenged with sit<>stand without UE but able to complete 7x from 56 cm before fatigued. Patient does have some popping in the right knee with LAQ, denies pain with activity. Encouraged patient to monitor frequency and if it becomes painful. Patient denies pain post-session. Recommend continued skilled PT to further improve balance, stabilization, strength, and mobility as able to improve ease with community mobility. Patient will continue to benefit from skilled PT services to modify and progress therapeutic interventions, address functional mobility deficits, address ROM deficits, address strength deficits, analyze and address soft tissue restrictions, analyze and cue movement patterns, analyze and modify body mechanics/ergonomics, assess and modify postural abnormalities, and instruct in home and community integration to attain remaining goals. []  See Plan of Care  []  See progress note/recertification  []  See Discharge Summary         Progress towards goals / Updated goals:  1. Patient will increase DGI to at least 19/24 to demonstrate decreased fall risk. Re-cert: 31/37   2. Patient will increase right hip strength and knee strength to at least 4+/5 to improve ease with transfers and stair negotiation. Re-cert: right hip flexion 4/5, right knee extension 4/5, right knee flexion 4/5   3. Patient will report no difficulty walking dog for 15 minutes to promote return to PLOF. Re-cert: remains - limited to 5' in backyard   4. Patient will increase tandem to at least 30\" to improve balance and decrease fall risk. Re-cert: 42\" MSR EO   Current: progressing 8/2/2022 - left tandem 10\", right tandem 15\"    5.  Patient will increase FOTO score to at least 59 to demonstrate improved ease with household activities.                Re-cert: regressed - 55     PLAN  []  Upgrade activities as tolerated     []  Continue plan of care  []  Update interventions per flow sheet       []  Discharge due to:_  []  Other:_      Kathe Pool, PT, DPT 8/2/2022  11:22 AM    Future Appointments   Date Time Provider Justo Altman   8/5/2022  3:00 PM Claudis Cart [facility]   8/9/2022 12:45 PM Claudis Cart [facility]   8/11/2022 12:00 PM Denisa Diaz PT [facility]   8/23/2022 11:15 AM Jillian Adela [facility]   8/26/2022 12:45 PM Jillian Adela [facility]

## 2022-08-04 ENCOUNTER — APPOINTMENT (OUTPATIENT)
Dept: PHYSICAL THERAPY | Age: 82
End: 2022-08-04
Payer: MEDICARE

## 2022-08-05 ENCOUNTER — HOSPITAL ENCOUNTER (OUTPATIENT)
Dept: PHYSICAL THERAPY | Age: 82
Discharge: HOME | End: 2022-08-05
Payer: MEDICARE

## 2022-08-05 PROCEDURE — 97110 THERAPEUTIC EXERCISES: CPT

## 2022-08-05 PROCEDURE — 97112 NEUROMUSCULAR REEDUCATION: CPT

## 2022-08-05 NOTE — PROGRESS NOTES
PT DAILY TREATMENT NOTE     Patient Name: Duncan Felipe  Date:2022  : 1940  [x]  Patient  Verified  Payor: VA MEDICARE / Plan: VA MEDICARE PART A & B / Product Type: Medicare /    In time:300  Out time:338  Total Treatment Time (min): 3  Visit #: 3 of 8    Medicare/BCBS Only   Total Timed Codes (min):  38 1:1 Treatment Time:  38       Treatment Area: Difficulty in walking, not elsewhere classified [R26.2]  Muscle weakness (generalized) [M62.81]    SUBJECTIVE  Pain Level (0-10 scale): 0  Any medication changes, allergies to medications, adverse drug reactions, diagnosis change, or new procedure performed?: [x] No    [] Yes (see summary sheet for update)  Subjective functional status/changes:   [] No changes reported  \"I like those exercises we did last time. \"     OBJECTIVE    23 min Therapeutic Exercise:  [x] See flow sheet :   Rationale: increase ROM, increase strength, and improve coordination to improve the patients ability to perform ADLs and self care tasks with greater ease     15 min Neuromuscular Re-education:  [x]  See flow sheet :balance activities, step-taps, DD core stabilization, sit<>stands    Rationale: increase strength, improve coordination, improve balance, and increase proprioception  to improve the patients ability to perform functional tasks with improved stabilization and control     With   [] TE   [] TA   [] neuro   [] other: Patient Education: [x] Review HEP    [] Progressed/Changed HEP based on:   [] positioning   [] body mechanics   [] transfers   [] heat/ice application    [] other:      Other Objective/Functional Measures: able to complete 8 sit<>stands     Pain Level (0-10 scale) post treatment: 0    ASSESSMENT/Changes in Function: Patient initially challenged with sit<>stands without UE support, however with having target in front of her to assist with weight shifting forward she was able to complete 8x from 56 cm.  Improving balance noted on non-compliant surface and narrow based of support. No pain post-session, though fatigued. Patient will continue to benefit from skilled PT services to modify and progress therapeutic interventions, address functional mobility deficits, address ROM deficits, address strength deficits, analyze and address soft tissue restrictions, analyze and cue movement patterns, analyze and modify body mechanics/ergonomics, assess and modify postural abnormalities, address imbalance/dizziness, and instruct in home and community integration to attain remaining goals. []  See Plan of Care  []  See progress note/recertification  []  See Discharge Summary         Progress towards goals / Updated goals:  1. Patient will increase DGI to at least 19/24 to demonstrate decreased fall risk. Re-cert: 63/54   2. Patient will increase right hip strength and knee strength to at least 4+/5 to improve ease with transfers and stair negotiation. Re-cert: right hip flexion 4/5, right knee extension 4/5, right knee flexion 4/5   3. Patient will report no difficulty walking dog for 15 minutes to promote return to PLOF. Re-cert: remains - limited to 5' in backyard    Current: remains 8/5/2022  4. Patient will increase tandem to at least 30\" to improve balance and decrease fall risk. Re-cert: 40\" MSR EO              Current: progressing 8/2/2022 - left tandem 10\", right tandem 15\"    5. Patient will increase FOTO score to at least 59 to demonstrate improved ease with household activities.                Re-cert: regressed - 55     PLAN  []  Upgrade activities as tolerated     []  Continue plan of care  []  Update interventions per flow sheet       []  Discharge due to:_  []  Other:_      April Thornton, PT, DPT 8/5/2022  3:09 PM    Future Appointments   Date Time Provider Justo Altman   8/9/2022 12:45 PM Ethan Mayorga [facility]   8/11/2022 12:00 PM Monisha Mauricio PT [facility]   8/23/2022 11:15 AM Swathi Cuenca [facility]   8/26/2022 12:45 PM Sangita Carlos [facility]

## 2022-08-09 ENCOUNTER — HOSPITAL ENCOUNTER (OUTPATIENT)
Dept: PHYSICAL THERAPY | Age: 82
Discharge: HOME | End: 2022-08-09
Payer: MEDICARE

## 2022-08-09 PROCEDURE — 97110 THERAPEUTIC EXERCISES: CPT

## 2022-08-09 PROCEDURE — 97112 NEUROMUSCULAR REEDUCATION: CPT

## 2022-08-09 NOTE — PROGRESS NOTES
PT DAILY TREATMENT NOTE     Patient Name: Douglas Bran  Date:2022  : 1940  [x]  Patient  Verified  Payor: VA MEDICARE / Plan: VA MEDICARE PART A & B / Product Type: Medicare /    In time:1245  Out time:128  Total Treatment Time (min): 43  Visit #: 4 of 8    Medicare/BCBS Only   Total Timed Codes (min):  43 1:1 Treatment Time:  43       Treatment Area: Difficulty in walking, not elsewhere classified [R26.2]  Muscle weakness (generalized) [M62.81]    SUBJECTIVE  Pain Level (0-10 scale): 0  Any medication changes, allergies to medications, adverse drug reactions, diagnosis change, or new procedure performed?: [x] No    [] Yes (see summary sheet for update)  Subjective functional status/changes:   [] No changes reported  \"I think better today. I got a good night's sleep last night. \"     OBJECTIVE    18 min Therapeutic Exercise:  [x] See flow sheet :   Rationale: increase ROM, increase strength, and improve coordination to improve the patients ability to perform ADLs and self care tasks with greater ease      25 min Neuromuscular Re-education:  [x]  See flow sheet : box carry with red ball 60' x 2, small loi negotiation, gait with head turns 60' x 2, airex balance activities, tandem balance, mary disc activities    Rationale: increase strength, improve coordination, improve balance, and increase proprioception  to improve the patients ability to perform functional tasks with improved stabilization and control           With   [] TE   [] TA   [] neuro   [] other: Patient Education: [x] Review HEP    [] Progressed/Changed HEP based on:   [] positioning   [] body mechanics   [] transfers   [] heat/ice application    [] other:      Other Objective/Functional Measures: added dynamic gait activities including walking with head turns, walking while carrying box with red ball, and small hurdles 3 (1x/each), progressed step taps and step ups to 8\"     Pain Level (0-10 scale) post treatment: 0    ASSESSMENT/Changes in Function: Patient with improve confidence with balance activities in therapy performing several exercises without UE support today. Also notable increase in endurance today as she was able to add dynamic gait activities without fatigue. Continues to require cueing to shift forward with sit<>stands and focus on \"smooth/consistent\" movement, but overall progressing. Patient is going on vacation next week and will be away from the clinic. Patient will continue to benefit from skilled PT services to modify and progress therapeutic interventions, address functional mobility deficits, address ROM deficits, address strength deficits, analyze and address soft tissue restrictions, analyze and cue movement patterns, analyze and modify body mechanics/ergonomics, assess and modify postural abnormalities, address imbalance/dizziness, and instruct in home and community integration to attain remaining goals. []  See Plan of Care  []  See progress note/recertification  []  See Discharge Summary         Progress towards goals / Updated goals:  1. Patient will increase DGI to at least 19/24 to demonstrate decreased fall risk. Re-cert: 19/20   2. Patient will increase right hip strength and knee strength to at least 4+/5 to improve ease with transfers and stair negotiation. Re-cert: right hip flexion 4/5, right knee extension 4/5, right knee flexion 4/5   3. Patient will report no difficulty walking dog for 15 minutes to promote return to PLOF. Re-cert: remains - limited to 5' in backyard               Current: remains 8/5/2022  4. Patient will increase tandem to at least 30\" to improve balance and decrease fall risk. Re-cert: 10\" MSR EO              Current: progressing 8/2/2022 - left tandem 10\", right tandem 15\", 8/9/2022 - left tandem 13\"   5.  Patient will increase FOTO score to at least 59 to demonstrate improved ease with household activities.                Re-cert: regressed - 55     PLAN  [x]  Upgrade activities as tolerated     []  Continue plan of care  []  Update interventions per flow sheet       []  Discharge due to:_  []  Other:_      Oli Pruitt PT, DPT  8/9/2022  12:50 PM    Future Appointments   Date Time Provider Justo Altman   8/11/2022 12:00 PM [facility]   8/23/2022 11:15 AM Wenda Or [facility]   8/26/2022 12:45 PM Wenda Or [facility]

## 2022-08-11 ENCOUNTER — HOSPITAL ENCOUNTER (OUTPATIENT)
Dept: PHYSICAL THERAPY | Age: 82
Discharge: HOME | End: 2022-08-11
Payer: MEDICARE

## 2022-08-11 PROCEDURE — 97110 THERAPEUTIC EXERCISES: CPT

## 2022-08-11 PROCEDURE — 97112 NEUROMUSCULAR REEDUCATION: CPT

## 2022-08-11 NOTE — PROGRESS NOTES
PT DAILY TREATMENT NOTE 10-18    Patient Name: Jose Solitario  Date:2022  : 1940  [x]  Patient  Verified  Payor: [place] MEDICARE / Plan: [place] MEDICARE PART A & B / Product Type: Medicare /    In time:1200  Out time:1239  Total Treatment Time (min): 39  Visit #: 5 of 8    Medicare/BCBS Only   Total Timed Codes (min):  39 1:1 Treatment Time:  39       Treatment Area: Difficulty in walking, not elsewhere classified [R26.2]  Muscle weakness (generalized) [M62.81]    SUBJECTIVE  Pain Level (0-10 scale): 0  Any medication changes, allergies to medications, adverse drug reactions, diagnosis change, or new procedure performed?: [x] No    [] Yes (see summary sheet for update)  Subjective functional status/changes:   [] No changes reported  I'm going to the beach next week. OBJECTIVE        24 min Therapeutic Exercise:  [x] See flow sheet :   Rationale: increase strength, improve coordination, improve balance, and increase proprioception to improve the patients ability to perform daily activities      15 min Neuromuscular Re-education:  [x]  See flow sheet :   Rationale: increase strength, improve coordination, improve balance, and increase proprioception  to improve the patients stability for daily activities       With   [] TE   [] TA   [] neuro   [] other: Patient Education: [x] Review HEP    [] Progressed/Changed HEP based on:   [] positioning   [] body mechanics   [] transfers   [] heat/ice application    [] other:      Other Objective/Functional Measures:      Pain Level (0-10 scale) post treatment: 0    ASSESSMENT/Changes in Function: LOB 2x with tandem stance B. No LOB with dynamic gait. Anterior swaying noted with Rhomberg EC, but no LOB. Patient will continue to benefit from skilled PT services to modify and progress therapeutic interventions, address strength deficits, analyze and cue movement patterns, and address imbalance/dizziness to attain remaining goals.      []  See Plan of Care  []  See progress note/recertification  []  See Discharge Summary         Progress towards goals / Updated goals:  1. Patient will increase DGI to at least 19/24 to demonstrate decreased fall risk. Re-cert: 11/82   2. Patient will increase right hip strength and knee strength to at least 4+/5 to improve ease with transfers and stair negotiation. Re-cert: right hip flexion 4/5, right knee extension 4/5, right knee flexion 4/5   3. Patient will report no difficulty walking dog for 15 minutes to promote return to PLOF. Re-cert: remains - limited to 5' in backyard               Current: remains 8/5/2022  4. Patient will increase tandem to at least 30\" to improve balance and decrease fall risk. Re-cert: 62\" MSR EO              Current: progressing 8/2/2022 - left tandem 10\", right tandem 15\", 8/9/2022 - left tandem 13\"   5. Patient will increase FOTO score to at least 59 to demonstrate improved ease with household activities.                Re-cert: regressed - 55     PLAN  [x]  Upgrade activities as tolerated     []  Continue plan of care  []  Update interventions per flow sheet       []  Discharge due to:_  []  Other:_      ANTONIO Dangelo, CMTPT 8/11/2022  8:48 AM    Future Appointments   Date Time Provider Justo Altman   8/11/2022 12:00 PM [facility]   8/23/2022 11:15 AM [facility]   8/26/2022 12:45 PM Sangita Carlos [facility]

## 2022-08-23 ENCOUNTER — HOSPITAL ENCOUNTER (OUTPATIENT)
Dept: PHYSICAL THERAPY | Age: 82
Discharge: HOME | End: 2022-08-23
Payer: MEDICARE

## 2022-08-23 PROCEDURE — 97112 NEUROMUSCULAR REEDUCATION: CPT

## 2022-08-23 PROCEDURE — 97116 GAIT TRAINING THERAPY: CPT

## 2022-08-23 PROCEDURE — 97110 THERAPEUTIC EXERCISES: CPT

## 2022-08-23 NOTE — PROGRESS NOTES
PT DAILY TREATMENT NOTE     Patient Name: Diya Tellez  Date:2022  : 1940  [x]  Patient  Verified  Payor: VA MEDICARE / Plan: VA MEDICARE PART A & B / Product Type: Medicare /    In time:1115  Out time:1156  Total Treatment Time (min): 41  Visit #: 6 of 8    Medicare/BCBS Only   Total Timed Codes (min):  41 1:1 Treatment Time:  41       Treatment Area: Difficulty in walking, not elsewhere classified [R26.2]  Muscle weakness (generalized) [M62.81]    SUBJECTIVE  Pain Level (0-10 scale): 0  Any medication changes, allergies to medications, adverse drug reactions, diagnosis change, or new procedure performed?: [x] No    [] Yes (see summary sheet for update)  Subjective functional status/changes:   [] No changes reported  Patient feels her legs are getting stronger. She had difficulty walking on the beach requiring her to hold on to other people to help walk. OBJECTIVE    16 min Therapeutic Exercise:  [x] See flow sheet :   Rationale: increase ROM, increase strength, and improve coordination to improve the patients ability to perform ADLs and self care tasks with greater ease     15 min Neuromuscular Re-education:  [x]  See flow sheet : box carry, balance beam lateral stepping, small hurdles forward and lateral    Rationale: increase strength, improve coordination, improve balance, and increase proprioception  to improve the patients ability to perform functional tasks with greater stabilization and control     10 min Gait Training:  DGI completion    Rationale:           With   [] TE   [] TA   [] neuro   [] other: Patient Education: [x] Review HEP    [] Progressed/Changed HEP based on:   [] positioning   [] body mechanics   [] transfers   [] heat/ice application    [] other:      Other Objective/Functional Measures: right hip flexion 4/5, right quad 4+/5, right HS 4+/5; DGI      Pain Level (0-10 scale) post treatment: 0    ASSESSMENT/Changes in Function: Patient presents for re-certification today. Patient has made continuous progress regarding balance and ambulation tolerance demonstrating 22/24 on DGI with decreased fall risk. She continues to be challenged with non-compliant surfaces and confidence with single leg activities, but is improving overall. Patient would benefit from skilled PT 2x/week for 4 more weeks to progress balance, LE strength, endurance, and overall mobility to promote improved safety with ambulation and daily activities. Patient will continue to benefit from skilled PT services to modify and progress therapeutic interventions, address functional mobility deficits, address strength deficits, analyze and address soft tissue restrictions, analyze and cue movement patterns, analyze and modify body mechanics/ergonomics, assess and modify postural abnormalities, address imbalance/dizziness, and instruct in home and community integration to attain remaining goals. []  See Plan of Care  [x]  See progress note/recertification  []  See Discharge Summary         Progress towards goals / Updated goals:  1. Patient will increase DGI to at least 19/24 to demonstrate decreased fall risk. Re-cert: 14/13    Current: met - 22/24   2. Patient will increase right hip strength and knee strength to at least 4+/5 to improve ease with transfers and stair negotiation. Re-cert: right hip flexion 4/5, right knee extension 4/5, right knee flexion 4/5    Current: progressing 8/23/2022 - right hip flexion 4/5, right quad 4+/5, right HS 4+/5   3. Patient will report no difficulty walking dog for 15 minutes to promote return to PLOF. Re-cert: remains - limited to 5' in backyard               Current: remains 8/5/2022  4. Patient will increase tandem to at least 30\" to improve balance and decrease fall risk. Re-cert: 14\" MSR EO              Current: progressing - right tandem 15\", left tandem 13\"   5.  Patient will increase FOTO score to at least 61 to demonstrate improved ease with household activities.                Re-cert: regressed - 46    Current: progressing - 52     PLAN  [x]  Upgrade activities as tolerated     []  Continue plan of care  []  Update interventions per flow sheet       []  Discharge due to:_  []  Other:_      Warren Martinez, PT, DPT  8/23/2022  11:17 AM    Future Appointments   Date Time Provider Justo Altman   8/26/2022 12:00 PM Doug Sanchez [facility]

## 2022-08-23 NOTE — PROGRESS NOTES
[facility]   [place] [address]  [place], [address]  [phone] fax    Continued Plan of Care/ Re-certification for Physical Therapy Services    Patient name: Beatriz Casillas Start of Care: 2022   Referral source: Shaheed Montelongo MD : 1940   Medical/Treatment Diagnosis: Difficulty in walking, not elsewhere classified [R26.2]  Muscle weakness (generalized) [M62.81]  Payor: [employer] / Plan: VA MEDICARE PART A & B / Product Type: Medicare /  Onset Date:2021     Prior Hospitalization: see medical history Provider#: [id number]   Medications: Verified on Patient Summary List     Comorbidities: allergies, arthritis, CHF, GI disease, history of MI, HTN, osteoporosis, PVD, right talocrural fixation   Prior Level of Function: no limitations, walking dog ~20 minutes 2x/day  Visits from Start of Care: 11    Missed Visits: 1    The Plan of Care and following information is based on the patient's current status:  1. Patient will increase DGI to at least / to demonstrate decreased fall risk. Re-cert:                Current: met -    2. Patient will increase right hip strength and knee strength to at least 4+/5 to improve ease with transfers and stair negotiation. Re-cert: right hip flexion 4/5, right knee extension 4/5, right knee flexion 4/5               Current: progressing - right hip flexion 4/5, right quad 4+/5, right HS 4+/5   3. Patient will report no difficulty walking dog for 15 minutes to promote return to PLOF. Re-cert: remains - limited to 5' in backyard               Current: remains   4. Patient will increase tandem to at least 30\" to improve balance and decrease fall risk. Re-cert: 02\" MSR EO              Current: progressing - right tandem 15\", left tandem 13\"   5.  Patient will increase FOTO score to at least 59 to demonstrate improved ease with household activities. Re-cert: regressed - 46               Current: progressing - 52     Key functional changes: DGI 22/24, improved FOTO score by 3, right tandem 15\", left tandem 13\"    Problems/ barriers to goal attainment: lapse in care due to vacation      Problem List: pain affecting function, decrease ROM, decrease strength, impaired gait/ balance, decrease ADL/ functional abilitiies, decrease activity tolerance, decrease flexibility/ joint mobility, and decrease transfer abilities    Treatment Plan: Therapeutic exercise, Therapeutic activities, Neuromuscular re-education, Physical agent/modality, Gait/balance training, Manual therapy, Patient education, Self Care training, Functional mobility training, Home safety training, and Stair training     Patient Goal (s) has been updated and includes:  \"increase balance and increase strength\"       Goals for this certification period to be accomplished in 4 weeks:  1. Patient will increase right hip strength and knee strength to at least 4+/5 to improve ease with transfers and stair negotiation. Re-cert: right hip flexion 4/5, right quad 4+/5, right HS 4+/5   2. Patient will report no difficulty walking dog for 15 minutes to promote return to PLOF. Re-cert: remains - limited to 5' in backyard   3. Patient will increase tandem to at least 30\" to improve balance and decrease fall risk. Re-cert: right tandem 86\", left tandem 13\"   4. Patient will increase FOTO score to at least 59 to demonstrate improved ease with household activities. Re-cert: 49     Frequency / Duration: Patient to be seen 2 times per week for 4 weeks:    Assessment / Recommendations:Patient presents for re-certification today. Patient has made continuous progress regarding balance and ambulation tolerance demonstrating 22/24 on DGI with decreased fall risk.  She continues to be challenged with non-compliant surfaces and confidence with single leg activities, but is improving overall. Patient would benefit from skilled PT 2x/week for 4 more weeks to progress balance, LE strength, endurance, and overall mobility to promote improved safety with ambulation and daily activities. Certification Period: 8/24/2022 - 9/22/2022    Mary Ellen Quintanilla PT, DPT 8/23/2022 11:32 AM    ________________________________________________________________________  I certify that the above Therapy Services are being furnished while the patient is under my care. I agree with the treatment plan and certify that this therapy is necessary. [] I have read the above and request that my patient continue as recommended.   [] I have read the above report and request that my patient continue therapy with the following changes/special instructions: _____________________________________________  [] I have read the above report and request that my patient be discharged from therapy    Physician's Signature:____________Date:_________TIME:________     Raúl Barton MD  ** Signature, Date and Time must be completed for valid certification **    Please sign and return to [facility] [address] [place] [address] & [place] [id number] [place] [address] [address]  [phone] fax

## 2022-08-26 ENCOUNTER — HOSPITAL ENCOUNTER (OUTPATIENT)
Dept: PHYSICAL THERAPY | Age: 82
Discharge: HOME | End: 2022-08-26
Payer: MEDICARE

## 2022-08-26 PROCEDURE — 97110 THERAPEUTIC EXERCISES: CPT

## 2022-08-26 PROCEDURE — 97112 NEUROMUSCULAR REEDUCATION: CPT

## 2022-08-26 NOTE — PROGRESS NOTES
PT DAILY TREATMENT NOTE     Patient Name: Sara Counter  Date:2022  : 1940  [x]  Patient  Verified  Payor: VA MEDICARE / Plan: VA MEDICARE PART A & B / Product Type: Medicare /    In time:10:32  Out time:11:15  Total Treatment Time (min): 43  Visit #: 1 of 8    Medicare/BCBS Only   Total Timed Codes (min):  43 1:1 Treatment Time:  43       Treatment Area: Difficulty in walking, not elsewhere classified [R26.2]  Muscle weakness (generalized) [M62.81]    SUBJECTIVE  Pain Level (0-10 scale): 3  Any medication changes, allergies to medications, adverse drug reactions, diagnosis change, or new procedure performed?: [x] No    [] Yes (see summary sheet for update)  Subjective functional status/changes:   [] No changes reported  Pt reports she was having a little sciatica pain this morning but she feels better after riding the warm up bike. OBJECTIVE      28 min Therapeutic Exercise:  [x] See flow sheet :   Rationale: increase ROM and increase strength to improve the patients ability to perform functional task with ease. 15 min Neuromuscular Re-education:  [x]  See flow sheet :   Rationale: increase strength, improve coordination, improve balance, and increase proprioception  to improve the patients ability to perform ADL's with ease. With   [] TE   [] TA   [] neuro   [] other: Patient Education: [x] Review HEP    [] Progressed/Changed HEP based on:   [] positioning   [] body mechanics   [] transfers   [] heat/ice application    [] other:      Other Objective/Functional Measures:      Pain Level (0-10 scale) post treatment: 0    ASSESSMENT/Changes in Function:   Increased glute and B hip mm soreness with progressed reps in therex however pt able to complete as prescribed. Pt does note some increased discomfort in the L/S due to her sciatica but was able to manage her symptoms and continue through therex.  Gait belt and CGA required with sit to stands and hurdles at the track this visit. Pt challenged with tandem stance with the right LE to the back noting she has a fused right ankle and that is her least stable side. The pt reports fatigue post treatment but left in no pain. Patient will continue to benefit from skilled PT services to modify and progress therapeutic interventions, address functional mobility deficits, address ROM deficits, address strength deficits, analyze and address soft tissue restrictions, analyze and cue movement patterns, analyze and modify body mechanics/ergonomics, and assess and modify postural abnormalities to attain remaining goals. [x]  See Plan of Care  []  See progress note/recertification  []  See Discharge Summary         Progress towards goals / Updated goals:  Goals for this certification period to be accomplished in 4 weeks:  1. Patient will increase right hip strength and knee strength to at least 4+/5 to improve ease with transfers and stair negotiation. Re-cert: right hip flexion 4/5, right quad 4+/5, right HS 4+/5   2. Patient will report no difficulty walking dog for 15 minutes to promote return to PLOF. Re-cert: remains - limited to 5' in backyard   3. Patient will increase tandem to at least 30\" to improve balance and decrease fall risk. Re-cert: right tandem 20\", left tandem 13\"   4. Patient will increase FOTO score to at least 59 to demonstrate improved ease with household activities.                Re-cert: 52     PLAN  []  Upgrade activities as tolerated     [x]  Continue plan of care  []  Update interventions per flow sheet       []  Discharge due to:_  []  Other:_      Christina Bland, PTA 8/26/2022  10:36 AM    Future Appointments   Date Time Provider Justo Altman   8/29/2022  1:45 PM Bob Chiang [facility]   9/2/2022  1:30 PM Abena Tan, PT [facility]   9/6/2022 10:30 AM Greg Gan, PT [facility]   9/9/2022  2:15 PM Dasha Cunningham [facility]

## 2022-08-29 ENCOUNTER — HOSPITAL ENCOUNTER (OUTPATIENT)
Dept: PHYSICAL THERAPY | Age: 82
Discharge: HOME | End: 2022-08-29
Payer: MEDICARE

## 2022-08-29 PROCEDURE — 97110 THERAPEUTIC EXERCISES: CPT

## 2022-08-29 PROCEDURE — 97112 NEUROMUSCULAR REEDUCATION: CPT

## 2022-08-29 NOTE — PROGRESS NOTES
PT DAILY TREATMENT NOTE     Patient Name: Beatriz Casillas  Date:2022  : 1940  [x]  Patient  Verified  Payor: VA MEDICARE / Plan: VA MEDICARE PART A & B / Product Type: Medicare /    In time:148  Out time:229  Total Treatment Time (min): 41  Visit #: 2 of 8    Medicare/BCBS Only   Total Timed Codes (min):  41 1:1 Treatment Time:  41       Treatment Area: Difficulty in walking, not elsewhere classified [R26.2]  Muscle weakness (generalized) [M62.81]    SUBJECTIVE  Pain Level (0-10 scale): 0  Any medication changes, allergies to medications, adverse drug reactions, diagnosis change, or new procedure performed?: [x] No    [] Yes (see summary sheet for update)  Subjective functional status/changes:   [] No changes reported  \"Just a little slow today. \" Patient reporting quite a bit of fatigue today unsure if it is related to walking around the art festival on Saturday though feels she should have recovered by now. Patient reports she is feeling more confident over the last few days in parking lots.      OBJECTIVE    26 min Therapeutic Exercise:  [x] See flow sheet :   Rationale: increase ROM, increase strength, and improve coordination to improve the patients ability to perform ADLs and self care tasks with greater ease      15 min Neuromuscular Re-education:  [x]  See flow sheet : balance activities with step taps and airex pad, dynamic gait activities   Rationale: increase strength, improve coordination, improve balance, and increase proprioception  to improve the patients ability to perform functional tasks with improved stabilization and control           With   [] TE   [] TA   [] neuro   [] other: Patient Education: [x] Review HEP    [] Progressed/Changed HEP based on:   [] positioning   [] body mechanics   [] transfers   [] heat/ice application    [] other:      Other Objective/Functional Measures: added head turns with gait today     Pain Level (0-10 scale) post treatment: 0    ASSESSMENT/Changes in Function: Despite increased fatigue today, patient able to perform activities as prescribed. Improved tandem balance noted today. Patient with 1 LOB requiring Ashley to correct with loi negotiation forward. She required 2 seated rest breaks during dynamic gait activities due to fatigue though no pain reported today. Patient will continue to benefit from skilled PT services to modify and progress therapeutic interventions, address functional mobility deficits, address ROM deficits, address strength deficits, analyze and address soft tissue restrictions, analyze and cue movement patterns, analyze and modify body mechanics/ergonomics, assess and modify postural abnormalities, address imbalance/dizziness, and instruct in home and community integration to attain remaining goals. []  See Plan of Care  []  See progress note/recertification  []  See Discharge Summary         Progress towards goals / Updated goals:  Goals for this certification period to be accomplished in 4 weeks:  1. Patient will increase right hip strength and knee strength to at least 4+/5 to improve ease with transfers and stair negotiation. Re-cert: right hip flexion 4/5, right quad 4+/5, right HS 4+/5   2. Patient will report no difficulty walking dog for 15 minutes to promote return to PLOF. Re-cert: remains - limited to 5' in backyard   3. Patient will increase tandem to at least 30\" to improve balance and decrease fall risk. Re-cert: right tandem 22\", left tandem 13\"    Current: near met 8/29/2022 - right 30\", left 15\" (limited due to ankle mobility on right with LE posteriorly)   4. Patient will increase FOTO score to at least 59 to demonstrate improved ease with household activities.                Re-cert: 52     PLAN  []  Upgrade activities as tolerated     [x]  Continue plan of care  []  Update interventions per flow sheet       []  Discharge due to:_  []  Other:_ Oli Pruitt, PT, DPT  8/29/2022  1:45 PM    Future Appointments   Date Time Provider Justo Altman   9/2/2022  1:30 PM Dave Malave, PT [facility]   9/6/2022 10:30 AM Malachi Cam, PT [facility]   9/9/2022  2:15 PM Wenda Or [facility]

## 2022-09-02 ENCOUNTER — HOSPITAL ENCOUNTER (OUTPATIENT)
Dept: PHYSICAL THERAPY | Age: 82
Discharge: HOME | End: 2022-09-02
Payer: MEDICARE

## 2022-09-02 PROCEDURE — 97110 THERAPEUTIC EXERCISES: CPT

## 2022-09-02 PROCEDURE — 97112 NEUROMUSCULAR REEDUCATION: CPT

## 2022-09-02 NOTE — PROGRESS NOTES
PT DAILY TREATMENT NOTE 10-18    Patient Name: Diya Tellez  Date:2022  : 1940  [x]  Patient  Verified  Payor: [place] MED[place]ARE / Plan: [place] MEDICARE PART A & B / Product Type: Medicare /    In time:128  Out time:209  Total Treatment Time (min): 41  Visit #: 3 of 8    Medicare/BCBS Only   Total Timed Codes (min):  41 1:1 Treatment Time:  41       Treatment Area: Difficulty in walking, not elsewhere classified [R26.2]  Muscle weakness (generalized) [M62.81]    SUBJECTIVE  Pain Level (0-10 scale): 0  Any medication changes, allergies to medications, adverse drug reactions, diagnosis change, or new procedure performed?: [x] No    [] Yes (see summary sheet for update)  Subjective functional status/changes:   [] No changes reported  My legs were a little sore this morning. OBJECTIVE    26 min Therapeutic Exercise:  [] See flow sheet :   Rationale: increase strength, improve coordination, improve balance, and increase proprioception to improve the patients ability to perform daily activities      15 min Neuromuscular Re-education:  []  See flow sheet :   Rationale: increase strength, improve coordination, improve balance, and increase proprioception  to improve the patients stability for daily activities     With   [] TE   [] TA   [] neuro   [] other: Patient Education: [x] Review HEP    [] Progressed/Changed HEP based on:   [] positioning   [] body mechanics   [] transfers   [] heat/ice application    [] other:      Other Objective/Functional Measures:      Pain Level (0-10 scale) post treatment: 0    ASSESSMENT/Changes in Function: No LOB with dynamic gait, and patient was able to sustain tandem stance with right LE leading x 30\" without LOB. Added 2# to hip  3 way to continue strengthening hips.      Patient will continue to benefit from skilled PT services to modify and progress therapeutic interventions, address strength deficits, analyze and address soft tissue restrictions, analyze and cue movement patterns, and address imbalance/dizziness to attain remaining goals. []  See Plan of Care  []  See progress note/recertification  []  See Discharge Summary         Progress towards goals / Updated goals:  Goals for this certification period to be accomplished in 4 weeks:  1. Patient will increase right hip strength and knee strength to at least 4+/5 to improve ease with transfers and stair negotiation. Re-cert: right hip flexion 4/5, right quad 4+/5, right HS 4+/5   2. Patient will report no difficulty walking dog for 15 minutes to promote return to PLOF. Re-cert: remains - limited to 5' in backyard   3. Patient will increase tandem to at least 30\" to improve balance and decrease fall risk. Re-cert: right tandem 98\", left tandem 13\"               Current: near met 8/29/2022 - right 30\", left 15\" (limited due to ankle mobility on right with LE posteriorly)   4. Patient will increase FOTO score to at least 59 to demonstrate improved ease with household activities.                Re-cert: 52        PLAN  []  Upgrade activities as tolerated     []  Continue plan of care  []  Update interventions per flow sheet       []  Discharge due to:_  []  Other:_      Stephanie Fletcher, MPT, CMTPT 9/2/2022  8:41 AM    Future Appointments   Date Time Provider Justo Altman   9/2/2022  1:30 PM Fredy So, PT [facility]   9/6/2022 10:30 AM Rebecca Nuñez, PT [facility]   9/9/2022  2:15 PM Alejandra Upton [facility]

## 2022-09-06 ENCOUNTER — HOSPITAL ENCOUNTER (OUTPATIENT)
Dept: PHYSICAL THERAPY | Age: 82
Discharge: HOME | End: 2022-09-06
Payer: MEDICARE

## 2022-09-06 PROCEDURE — 97112 NEUROMUSCULAR REEDUCATION: CPT

## 2022-09-06 PROCEDURE — 97110 THERAPEUTIC EXERCISES: CPT

## 2022-09-06 NOTE — PROGRESS NOTES
PT DAILY TREATMENT NOTE     Patient Name: Duncan Felipe  Date:2022  : 1940  [x]  Patient  Verified  Payor: [place] MEDICARE / Plan: [place] MEDICARE PART A & B / Product Type: Medicare /    In time:10:30 AM  Out time:11:13 AM  Total Treatment Time (min): 43  Visit #: 4 of 8    Medicare/BCBS Only   Total Timed Codes (min):  43 1:1 Treatment Time:  43       Treatment Area: Difficulty in walking, not elsewhere classified [R26.2]  Muscle weakness (generalized) [M62.81]    SUBJECTIVE  Pain Level (0-10 scale): 0  Any medication changes, allergies to medications, adverse drug reactions, diagnosis change, or new procedure performed?: [x] No    [] Yes (see summary sheet for update)  Subjective functional status/changes:   [x] No changes reported    OBJECTIVE    17 min Therapeutic Exercise:  [x] See flow sheet :   Rationale: increase ROM and increase strength to improve the patients ability to perform ADLs with ease. 26 min Neuromuscular Re-education:  [x]  See flow sheet :   Rationale: increase strength, improve coordination, improve balance, and increase proprioception  to improve the patients ability to navigate dynamic surfaces. With   [] TE   [] TA   [] neuro   [] other: Patient Education: [x] Review HEP    [] Progressed/Changed HEP based on:   [] positioning   [] body mechanics   [] transfers   [] heat/ice application    [] other:      Other Objective/Functional Measures:      Pain Level (0-10 scale) post treatment: 0    ASSESSMENT/Changes in Function: Patient requiring frequent breaks today, states she is feeling more tired than usual. Declined sit to stand at end of session due to fatigue. Patient leaves the clinic in no apparent distress.      Patient will continue to benefit from skilled PT services to modify and progress therapeutic interventions, address functional mobility deficits, address ROM deficits, address strength deficits, analyze and address soft tissue restrictions, analyze and cue movement patterns, and analyze and modify body mechanics/ergonomics to attain remaining goals. Progress towards goals / Updated goals:  Goals for this certification period to be accomplished in 4 weeks:  1. Patient will increase right hip strength and knee strength to at least 4+/5 to improve ease with transfers and stair negotiation. Re-cert: right hip flexion 4/5, right quad 4+/5, right HS 4+/5   2. Patient will report no difficulty walking dog for 15 minutes to promote return to PLOF. Re-cert: remains - limited to 5' in backyard   3. Patient will increase tandem to at least 30\" to improve balance and decrease fall risk. Re-cert: right tandem 39\", left tandem 13\"               Current: near met 8/29/2022 - right 30\", left 15\" (limited due to ankle mobility on right with LE posteriorly)   4. Patient will increase FOTO score to at least 59 to demonstrate improved ease with household activities.                Re-cert: 52     PLAN  [x]  Upgrade activities as tolerated     []  Continue plan of care  []  Update interventions per flow sheet       []  Discharge due to:_  []  Other:_      Mark Lemus, PT 9/6/2022  10:35 AM    Future Appointments   Date Time Provider Justo Altman   9/9/2022  2:15 PM [facility]

## 2022-09-09 ENCOUNTER — HOSPITAL ENCOUNTER (OUTPATIENT)
Dept: PHYSICAL THERAPY | Age: 82
Discharge: HOME | End: 2022-09-09
Payer: MEDICARE

## 2022-09-09 PROCEDURE — 97112 NEUROMUSCULAR REEDUCATION: CPT

## 2022-09-09 PROCEDURE — 97110 THERAPEUTIC EXERCISES: CPT

## 2022-09-09 NOTE — PROGRESS NOTES
PT DISCHARGE DAILY NOTE AND [id number]    Date:2022  Patient name: Corina Valles Start of Care: 2022   Referral source: Leigha Johnson MD : 1940   Medical/Treatment Diagnosis: Difficulty in walking, not elsewhere classified [R26.2]  Muscle weakness (generalized) [M62.81] Onset Date:2021     Prior Hospitalization: see medical history Provider#: [id number]   Medications: Verified on Patient Summary List     Comorbidities: allergies, arthritis, CHF, GI disease, history of MI, HTN, osteoporosis, PVD, right talocrural fixation   Prior Level of Function: no limitations, walking dog ~20 minutes 2x/day    Visits from Start of Care: 16    Missed Visits: 1    Reporting Period : 2022 to 2022    [x]  Patient  Verified  Payor: [place] MEDICARE / Plan: [place] MEDICARE PART A & B / Product Type: Medicare /    In time:215  Out time:256  Total Treatment Time (min): 41  Visit #: 5 of 8    Medicare/BCBS Only   Total Timed Codes (min):  41 1:1 Treatment Time:  41       SUBJECTIVE  Pain Level (0-10 scale): 0  Any medication changes, allergies to medications, adverse drug reactions, diagnosis change, or new procedure performed?: [x] No    [] Yes (see summary sheet for update)  Subjective functional status/changes:   [] No changes reported  No pain reported but is tired today due to waking up every hour for the bathroom since 3 AM.     OBJECTIVE    26 min Therapeutic Exercise:  [x] See flow sheet :   Rationale: increase ROM, increase strength, and improve coordination to improve the patients ability to perform ADLs and self care tasks with greater ease      15 min Neuromuscular Re-education:  [x]  See flow sheet : dynadisc series, balance activities with airex   Rationale: increase strength, improve coordination, improve balance, and increase proprioception  to improve the patients ability to perform functional tasks with greater stabilization and control           With   [] TE   [] TA   [] neuro   [] other: Patient Education: [x] Review HEP    [] Progressed/Changed HEP based on:   [] positioning   [] body mechanics   [] transfers   [] heat/ice application    [] other:      Other Objective/Functional Measures: FOTO = 54     Pain Level (0-10 scale) post treatment: 0    Summary of Care:  Goals for this certification period to be accomplished in 4 weeks:  1. Patient will increase right hip strength and knee strength to at least 4+/5 to improve ease with transfers and stair negotiation. Re-cert: right hip flexion 4/5, right quad 4+/5, right HS 4+/5    Current: Met - 4+/5 flexion, quad 4+/5, HS 5-/5  2. Patient will report no difficulty walking dog for 15 minutes to promote return to PLOF. Re-cert: remains - limited to 5' in backyard    Current: limited to backyard still but not limited on time  3. Patient will increase tandem to at least 30\" to improve balance and decrease fall risk. Re-cert: right tandem 04\", left tandem 13\"               Current: near met 8/29/2022 - right 30\", left 15\" (limited due to ankle mobility on right with LE posteriorly)   4. Patient will increase FOTO score to at least 59 to demonstrate improved ease with household activities. Re-cert: 49    Current: progressed to 54    ASSESSMENT/Changes in Function: Patient presents for last visit under current plan of care. Overall, progressing with balance and right hip strength grossly. Continues to have some limitations with prolonged activity but encouraged patient to walk with her daughter with gradual increase in distance/time as tolerated to improve endurance. She also reports greater ease with stair negotiation as she feels stronger compared to a few weeks ago. Patient provided updated HEP today to continue progressing towards LTGs independently. No further questions or concerns noted at this time.      Thank you for this referral!     PLAN  [x]Discontinue therapy: [x]Patient has reached or is progressing toward set goals      []Patient is non-compliant or has abdicated      []Due to lack of appreciable progress towards set goals    Cristian Ye PT, DPT  9/9/2022  2:19 PM

## 2022-09-09 NOTE — PROGRESS NOTES
Physical Therapy Discharge Instructions      [facility]  [address] [facility] [address]  [place], [address]  [phone] fax    Patient: Srinivas Rodriguez  : 1940      Continue Home Exercise Program 1 times per day for 2 weeks, then decrease to 3-4 times per week          Follow up with MD:     [] Upon completion of therapy     [x] As needed      Recommendations:     [x]   Return to activity with home program    []   Return to activity with the following modifications:       []Post Rehab Program    []Join Independent aquatic program     []Return to/join local gym        Additional Comments: Gradual progression with increase in walking tolerance. Discussed using pedal bike at home as able to improve endurance.            Yesenia Fonseca, PT, DPT 2022 2:28 PM

## 2023-06-12 RX ORDER — ACETAMINOPHEN 500 MG
500 TABLET ORAL PRN
COMMUNITY

## 2023-06-12 RX ORDER — ONDANSETRON 4 MG/1
8 TABLET, ORALLY DISINTEGRATING ORAL PRN
COMMUNITY

## 2023-06-12 RX ORDER — LORAZEPAM 0.5 MG/1
0.5 TABLET ORAL NIGHTLY PRN
COMMUNITY

## 2023-06-12 RX ORDER — TELMISARTAN 80 MG/1
80 TABLET ORAL DAILY
COMMUNITY

## 2023-06-12 RX ORDER — BUSPIRONE HYDROCHLORIDE 10 MG/1
10 TABLET ORAL 2 TIMES DAILY
COMMUNITY

## 2023-06-12 RX ORDER — POLYETHYLENE GLYCOL 3350 17 G/17G
17 POWDER, FOR SOLUTION ORAL DAILY PRN
COMMUNITY

## 2023-06-12 RX ORDER — FAMOTIDINE 20 MG/1
1 TABLET, FILM COATED ORAL PRN
COMMUNITY

## 2023-06-12 RX ORDER — M-VIT,TX,IRON,MINS/CALC/FOLIC 27MG-0.4MG
1 TABLET ORAL DAILY
COMMUNITY

## 2023-06-20 ENCOUNTER — ANESTHESIA EVENT (OUTPATIENT)
Facility: HOSPITAL | Age: 83
End: 2023-06-20
Payer: MEDICARE

## 2023-06-21 ENCOUNTER — HOSPITAL ENCOUNTER (OUTPATIENT)
Facility: HOSPITAL | Age: 83
Setting detail: OUTPATIENT SURGERY
Discharge: HOME | End: 2023-06-21
Attending: INTERNAL MEDICINE | Admitting: INTERNAL MEDICINE
Payer: MEDICARE

## 2023-06-21 ENCOUNTER — ANESTHESIA (OUTPATIENT)
Facility: HOSPITAL | Age: 83
End: 2023-06-21
Payer: MEDICARE

## 2023-06-21 VITALS
OXYGEN SATURATION: 100 % | DIASTOLIC BLOOD PRESSURE: 55 MMHG | TEMPERATURE: 97.7 F | HEART RATE: 63 BPM | WEIGHT: 101.4 LBS | SYSTOLIC BLOOD PRESSURE: 185 MMHG | BODY MASS INDEX: 14.52 KG/M2 | RESPIRATION RATE: 17 BRPM | HEIGHT: 70 IN

## 2023-06-21 LAB
ANION GAP SERPL CALC-SCNC: 5 MMOL/L (ref 3–18)
BUN SERPL-MCNC: 27 MG/DL (ref 7–18)
BUN/CREAT SERPL: 25 (ref 12–20)
CALCIUM SERPL-MCNC: 9.4 MG/DL (ref 8.5–10.1)
CHLORIDE SERPL-SCNC: 96 MMOL/L (ref 100–111)
CO2 SERPL-SCNC: 27 MMOL/L (ref 21–32)
CREAT SERPL-MCNC: 1.08 MG/DL (ref 0.6–1.3)
GLUCOSE SERPL-MCNC: 98 MG/DL (ref 74–99)
POTASSIUM SERPL-SCNC: 5.2 MMOL/L (ref 3.5–5.5)
SODIUM SERPL-SCNC: 128 MMOL/L (ref 136–145)

## 2023-06-21 PROCEDURE — 2709999900 HC NON-CHARGEABLE SUPPLY: Performed by: INTERNAL MEDICINE

## 2023-06-21 PROCEDURE — 80048 BASIC METABOLIC PNL TOTAL CA: CPT

## 2023-06-21 PROCEDURE — 3700000000 HC ANESTHESIA ATTENDED CARE: Performed by: INTERNAL MEDICINE

## 2023-06-21 PROCEDURE — 3600007502: Performed by: INTERNAL MEDICINE

## 2023-06-21 PROCEDURE — 7100000000 HC PACU RECOVERY - FIRST 15 MIN: Performed by: INTERNAL MEDICINE

## 2023-06-21 PROCEDURE — 2500000003 HC RX 250 WO HCPCS: Performed by: ANESTHESIOLOGY

## 2023-06-21 PROCEDURE — 3700000001 HC ADD 15 MINUTES (ANESTHESIA): Performed by: INTERNAL MEDICINE

## 2023-06-21 PROCEDURE — 3600007512: Performed by: INTERNAL MEDICINE

## 2023-06-21 PROCEDURE — 6360000002 HC RX W HCPCS: Performed by: ANESTHESIOLOGY

## 2023-06-21 PROCEDURE — 88305 TISSUE EXAM BY PATHOLOGIST: CPT

## 2023-06-21 PROCEDURE — 7100000010 HC PHASE II RECOVERY - FIRST 15 MIN: Performed by: INTERNAL MEDICINE

## 2023-06-21 PROCEDURE — 2580000003 HC RX 258: Performed by: NURSE ANESTHETIST, CERTIFIED REGISTERED

## 2023-06-21 RX ORDER — SODIUM CHLORIDE 0.9 % (FLUSH) 0.9 %
5-40 SYRINGE (ML) INJECTION EVERY 12 HOURS SCHEDULED
Status: DISCONTINUED | OUTPATIENT
Start: 2023-06-21 | End: 2023-06-21 | Stop reason: HOSPADM

## 2023-06-21 RX ORDER — LABETALOL HYDROCHLORIDE 5 MG/ML
INJECTION, SOLUTION INTRAVENOUS PRN
Status: DISCONTINUED | OUTPATIENT
Start: 2023-06-21 | End: 2023-06-21 | Stop reason: SDUPTHER

## 2023-06-21 RX ORDER — SODIUM CHLORIDE 9 MG/ML
INJECTION, SOLUTION INTRAVENOUS PRN
Status: DISCONTINUED | OUTPATIENT
Start: 2023-06-21 | End: 2023-06-21 | Stop reason: HOSPADM

## 2023-06-21 RX ORDER — SODIUM CHLORIDE 0.9 % (FLUSH) 0.9 %
5-40 SYRINGE (ML) INJECTION PRN
Status: DISCONTINUED | OUTPATIENT
Start: 2023-06-21 | End: 2023-06-21 | Stop reason: HOSPADM

## 2023-06-21 RX ORDER — LIDOCAINE HYDROCHLORIDE 10 MG/ML
1 INJECTION, SOLUTION EPIDURAL; INFILTRATION; INTRACAUDAL; PERINEURAL
Status: DISCONTINUED | OUTPATIENT
Start: 2023-06-21 | End: 2023-06-21 | Stop reason: HOSPADM

## 2023-06-21 RX ORDER — PROPOFOL 10 MG/ML
INJECTION, EMULSION INTRAVENOUS PRN
Status: DISCONTINUED | OUTPATIENT
Start: 2023-06-21 | End: 2023-06-21 | Stop reason: SDUPTHER

## 2023-06-21 RX ORDER — HYDRALAZINE HYDROCHLORIDE 20 MG/ML
INJECTION INTRAMUSCULAR; INTRAVENOUS PRN
Status: DISCONTINUED | OUTPATIENT
Start: 2023-06-21 | End: 2023-06-21 | Stop reason: SDUPTHER

## 2023-06-21 RX ADMIN — HYDRALAZINE HYDROCHLORIDE 10 MG: 20 INJECTION INTRAMUSCULAR; INTRAVENOUS at 10:26

## 2023-06-21 RX ADMIN — SODIUM CHLORIDE: 9 INJECTION, SOLUTION INTRAVENOUS at 10:18

## 2023-06-21 RX ADMIN — PROPOFOL 25 MG: 10 INJECTION, EMULSION INTRAVENOUS at 10:48

## 2023-06-21 RX ADMIN — PROPOFOL 50 MG: 10 INJECTION, EMULSION INTRAVENOUS at 10:46

## 2023-06-21 RX ADMIN — HYDRALAZINE HYDROCHLORIDE 10 MG: 20 INJECTION INTRAMUSCULAR; INTRAVENOUS at 10:36

## 2023-06-21 RX ADMIN — LABETALOL HYDROCHLORIDE 5 MG: 5 INJECTION, SOLUTION INTRAVENOUS at 10:42

## 2023-06-21 ASSESSMENT — PAIN - FUNCTIONAL ASSESSMENT: PAIN_FUNCTIONAL_ASSESSMENT: 0-10

## 2023-06-21 NOTE — H&P
Chief Complaint:    wt loss , anorexia, early satiety, anemia     History of Present Illness: This patient is an 21-year-old female seen for the above. She was found to have a normal hemoglobin of 13 last January. Most recently her hemoglobin is decreased to 8.1. She has been on Eliquis for atrial fibrillation. She is scheduled in the near future for an iron infusion. She denies any melena or hematochezia but does note occasional postprandial nausea. In addition she describes intermittent epigastric pain. I note that she has been on a PPI for years. She has been losing weight steadily. Pt has a Hx of collagenous colitis. Past Medical History  Medical Conditions:   Acid Reflux  Anemia  Anxiety disorder  Arthritis  Atrial Fibrillation  Blood Clots (leg)  Congestive Heart Failure  Coronary Stent  Diverticulitis  fluid on lungs  Heart Attack  High Blood Pressure  High Cholesterol  microscopic colitis  Milk Intolerance  Peripheral Vascular Disease  Surgical Procedures:    Ankle,Cataract Surgery  Coronary artery stent  Hysterectomy  Tonsillectomy  Lt. wrist repair broken    Dx Studies:   CT Abd, Pelvis, Chest, 2012  Mammogram, 4-14  Medications:   B Complex-Vitamin B12 Take 1 tablet by mouth once a day  budesonide 3 mg Take 3 capsule by mouth once a day  buspirone 10 mg  Calcium 600 600 mg calcium (1,500 mg) Take 1 tablet by mouth once a day  cholecalciferol (vitamin D3) 1,000 unit Take 2 tablet by mouth twice a day  Eliquis 2.5 mg Take 1 tablet by mouth once a day  ferrous sulfate 325 mg (65 mg iron) Take 1 tablet by mouth once a day  hydralazine 100 mg Take 1 tablet by mouth once a day  lorazepam 0.5 mg Take 1 tablet by mouth as needed  metoprolol tartrate 25 mg Take 3 tablet by mouth every twelve hours  Multaq 400 mg Take 1 tablet by mouth once a day  mycophenolate sodium 360 mg Take 1 tablet by mouth once a day  ondansetron 4 mg Take 1 tablet by mouth as needed  One Daily Gummy Vites 200 mcg Take 1 tablet by

## 2023-06-21 NOTE — ANESTHESIA PRE PROCEDURE
Department of Anesthesiology  Preprocedure Note       Name:  Claudine Summers   Age:  81 y.o.  :  1940                                          MRN:  [id number]         Date:  2023      Surgeon: Maycol Talamantes):  Hans العلي MD    Procedure: Procedure(s):  EGD ESOPHAGOGASTRODUODENOSCOPY    Medications prior to admission:   Prior to Admission medications    Medication Sig Start Date End Date Taking? Authorizing Provider   apixaban (ELIQUIS) 2.5 MG TABS tablet Take 1 tablet by mouth 2 times daily 10/27/22  Yes Historical Provider, MD   cyanocobalamin 500 MCG tablet Take 1 tablet by mouth daily 22  Yes Historical Provider, MD   metoprolol tartrate (LOPRESSOR) 25 MG tablet Take 1 tablet by mouth 2 times daily 22  Yes Historical Provider, MD   dronedarone hcl (MULTAQ) 400 MG TABS Take 1 tablet by mouth 2 times daily (with meals) 10/27/22  Yes Historical Provider, MD   famotidine (PEPCID) 20 MG tablet Take 1 tablet by mouth as needed    Historical Provider, MD   Multiple Vitamins-Minerals (THERAPEUTIC MULTIVITAMIN-MINERALS) tablet Take 1 tablet by mouth daily    Historical Provider, MD   acetaminophen (TYLENOL) 500 MG tablet Take 1 tablet by mouth as needed    Historical Provider, MD   busPIRone (BUSPAR) 10 MG tablet Take 1 tablet by mouth 2 times daily    Historical Provider, MD   Carboxymeth-Glycerin-Polysorb 0.5-1-0.5 % SOLN 1 drop 4 times daily as needed    Historical Provider, MD   LORazepam (ATIVAN) 0.5 MG tablet Take 1 tablet by mouth nightly as needed.     Historical Provider, MD   polyethylene glycol (GLYCOLAX) 17 GM/SCOOP powder Take 17 g by mouth daily as needed    Historical Provider, MD   telmisartan (MICARDIS) 80 MG tablet Take 1 tablet by mouth daily    Historical Provider, MD   ondansetron (ZOFRAN-ODT) 4 MG disintegrating tablet Take 2 tablets by mouth as needed    Historical Provider, MD   aspirin 81 MG chewable tablet Take 81 mg by mouth daily (with breakfast)  Patient not taking:

## 2023-06-21 NOTE — ANESTHESIA POSTPROCEDURE EVALUATION
Department of Anesthesiology  Postprocedure Note    Patient: Moise Garcia  MRN: [id number]  YOB: 1940  Date of evaluation: 6/21/2023      Procedure Summary     Date: 06/21/23 Room / Location: [facility] ENDO 03 / [facility] END[facility]COPY    Anesthesia Start: 1032 Anesthesia Stop: 1059    Procedure: EGD ESOPHAGOGASTRODUODENOSCOPY with bx's (Upper GI Region) Diagnosis:       Anemia due to chronic blood loss      Early satiety      Abnormal weight loss      Anorexia      (Anemia due to chronic blood loss [D50.0])      (Early satiety [R68.81])      (Abnormal weight loss [R63.4])      (Anorexia [R63.0])    Surgeons: Joselyn Castleman, MD Responsible Provider: Berto Senior MD    Anesthesia Type: MAC ASA Status: 4          Anesthesia Type: MAC    Clive Phase I: Clive Score: 10    Clive Phase II: Clive Score: 10      Anesthesia Post Evaluation    Patient location during evaluation: PACU  Patient participation: complete - patient participated  Level of consciousness: awake  Airway patency: patent  Nausea & Vomiting: no nausea  Complications: no  Cardiovascular status: blood pressure returned to baseline  Respiratory status: acceptable  Hydration status: euvolemic

## 2023-06-21 NOTE — DISCHARGE INSTRUCTIONS
Upper GI Endoscopy: What to Expect at [address] had an upper GI endoscopy. Your doctor used a thin, lighted tube that bends to look at the inside of your esophagus, your stomach, and the first part of the small intestine, called the duodenum. After you have an endoscopy, you will stay at the hospital or clinic for 1 to 2 hours. This will allow the medicine to wear off. You will be able to go home after your doctor or nurse checks to make sure that you're not having any problems. You may have to stay overnight if you had treatment during the test. You may have a sore throat for a day or two after the test.  This care sheet gives you a general idea about what to expect after the test.  How can you care for yourself at home? Activity   Rest as much as you need to after you go home. You should be able to go back to your usual activities the day after the test.  Diet   Follow your doctor's directions for eating after the test.  Drink plenty of fluids (unless your doctor has told you not to). Medications   If you have a sore throat the day after the test, use an over-the-counter spray to numb your throat. Follow-up care is a key part of your treatment and safety. Be sure to make and go to all appointments, and call your doctor if you are having problems. It's also a good idea to know your test results and keep a list of the medicines you take. When should you call for help? Call 911 anytime you think you may need emergency care. For example, call if:    You passed out (lost consciousness). You have trouble breathing. You pass maroon or bloody stools. Call your doctor now or seek immediate medical care if:    You have pain that does not get better after your take pain medicine. You have new or worse belly pain. You have blood in your stools. You are sick to your stomach and cannot keep fluids down. You have a fever. You cannot pass stools or gas.    Watch closely for

## 2025-03-26 ENCOUNTER — TRANSCRIBE ORDERS (OUTPATIENT)
Facility: HOSPITAL | Age: 85
End: 2025-03-26

## 2025-03-26 DIAGNOSIS — M81.0 OSTEOPOROSIS, UNSPECIFIED OSTEOPOROSIS TYPE, UNSPECIFIED PATHOLOGICAL FRACTURE PRESENCE: Primary | ICD-10-CM

## 2025-06-19 ENCOUNTER — HOSPITAL ENCOUNTER (OUTPATIENT)
Facility: HOSPITAL | Age: 85
Discharge: HOME | End: 2025-06-22
Payer: MEDICARE

## 2025-06-19 DIAGNOSIS — M81.0 OSTEOPOROSIS, UNSPECIFIED OSTEOPOROSIS TYPE, UNSPECIFIED PATHOLOGICAL FRACTURE PRESENCE: ICD-10-CM

## 2025-06-19 PROCEDURE — 77080 DXA BONE DENSITY AXIAL: CPT

## (undated) DEVICE — BASIN EMSIS 16OZ GRAPHITE PLAS KID SHP MOLD GRAD FOR ORAL

## (undated) DEVICE — UNDERPAD INCONT W23XL36IN STD BLU POLYPR BK FLUF SFT

## (undated) DEVICE — BITE BLOCK ENDOSCP UNIV AD 6 TO 9.4 MM

## (undated) DEVICE — FORCEPS BX L240CM JAW DIA2.4MM ORNG L CAP W/ NDL DISP RAD

## (undated) DEVICE — CATHETER SUCT TR FL TIP 14FR W/ O CTRL

## (undated) DEVICE — SYRINGE MED 50ML LUERSLIP TIP

## (undated) DEVICE — STERILE POLYISOPRENE POWDER-FREE SURGICAL GLOVES: Brand: PROTEXIS

## (undated) DEVICE — CANNULA NSL AD TBNG L14FT STD PVC O2 CRV CONN NONFLARED NSL

## (undated) DEVICE — YANKAUER,SMOOTH HANDLE,HIGH CAPACITY: Brand: MEDLINE INDUSTRIES, INC.

## (undated) DEVICE — LINER SUCT CANSTR 3000CC PLAS SFT PRE ASSEMB W/OUT TBNG W/

## (undated) DEVICE — GAUZE,SPONGE,4"X4",16PLY,STRL,LF,10/TRAY: Brand: MEDLINE

## (undated) DEVICE — SOLUTION IRRIG 1000ML H2O STRL BLT

## (undated) DEVICE — MEDI-VAC NON-CONDUCTIVE SUCTION TUBING: Brand: CARDINAL HEALTH

## (undated) DEVICE — SYRINGE MED 25GA 3ML L5/8IN SUBQ PLAS W/ DETACH NDL SFTY

## (undated) DEVICE — ENDOSCOPY PUMP TUBING/ CAP SET: Brand: ERBE